# Patient Record
Sex: MALE | Race: OTHER | Employment: OTHER | URBAN - METROPOLITAN AREA
[De-identification: names, ages, dates, MRNs, and addresses within clinical notes are randomized per-mention and may not be internally consistent; named-entity substitution may affect disease eponyms.]

---

## 2017-06-29 ENCOUNTER — LAB ENCOUNTER (OUTPATIENT)
Dept: LAB | Age: 77
End: 2017-06-29
Attending: INTERNAL MEDICINE
Payer: MEDICAID

## 2017-06-29 ENCOUNTER — HOSPITAL ENCOUNTER (OUTPATIENT)
Dept: GENERAL RADIOLOGY | Age: 77
Discharge: HOME | End: 2017-06-29
Attending: INTERNAL MEDICINE
Payer: MEDICAID

## 2017-06-29 DIAGNOSIS — I10 HTN (HYPERTENSION): Primary | ICD-10-CM

## 2017-06-29 DIAGNOSIS — H11.002 PTERYGIUM OF LEFT EYE: ICD-10-CM

## 2017-06-29 DIAGNOSIS — M46.90: ICD-10-CM

## 2017-06-29 DIAGNOSIS — H91.90 HEARING LOSS: ICD-10-CM

## 2017-06-29 DIAGNOSIS — M46.90 INFLAMMATORY SPONDYLOPATHY (HCC): ICD-10-CM

## 2017-06-29 LAB
ALBUMIN SERPL BCP-MCNC: 3.7 G/DL (ref 3.5–4.8)
ALBUMIN/GLOB SERPL: 1 {RATIO} (ref 1–2)
ALP SERPL-CCNC: 83 U/L (ref 32–100)
ALT SERPL-CCNC: 16 U/L (ref 17–63)
ANION GAP SERPL CALC-SCNC: 12 MMOL/L (ref 0–18)
AST SERPL-CCNC: 22 U/L (ref 15–41)
BASOPHILS # BLD: 0.1 K/UL (ref 0–0.2)
BASOPHILS NFR BLD: 1 %
BILIRUB SERPL-MCNC: 0.7 MG/DL (ref 0.3–1.2)
BILIRUB UR QL: NEGATIVE
BUN SERPL-MCNC: 21 MG/DL (ref 8–20)
BUN/CREAT SERPL: 21 (ref 10–20)
CALCIUM SERPL-MCNC: 9.3 MG/DL (ref 8.5–10.5)
CHLORIDE SERPL-SCNC: 106 MMOL/L (ref 95–110)
CHOLEST SERPL-MCNC: 242 MG/DL (ref 110–200)
CLARITY UR: CLEAR
CO2 SERPL-SCNC: 23 MMOL/L (ref 22–32)
COLOR UR: YELLOW
CREAT SERPL-MCNC: 1 MG/DL (ref 0.5–1.5)
EOSINOPHIL # BLD: 0.2 K/UL (ref 0–0.7)
EOSINOPHIL NFR BLD: 3 %
ERYTHROCYTE [DISTWIDTH] IN BLOOD BY AUTOMATED COUNT: 14.5 % (ref 11–15)
GLOBULIN PLAS-MCNC: 3.6 G/DL (ref 2.5–3.7)
GLUCOSE SERPL-MCNC: 82 MG/DL (ref 70–99)
GLUCOSE UR-MCNC: NEGATIVE MG/DL
HCT VFR BLD AUTO: 39.8 % (ref 41–52)
HDLC SERPL-MCNC: 44 MG/DL
HGB BLD-MCNC: 13.5 G/DL (ref 13.5–17.5)
HGB UR QL STRIP.AUTO: NEGATIVE
KETONES UR-MCNC: NEGATIVE MG/DL
LDLC SERPL CALC-MCNC: 176 MG/DL (ref 0–99)
LEUKOCYTE ESTERASE UR QL STRIP.AUTO: NEGATIVE
LYMPHOCYTES # BLD: 1.9 K/UL (ref 1–4)
LYMPHOCYTES NFR BLD: 28 %
MCH RBC QN AUTO: 32.1 PG (ref 27–32)
MCHC RBC AUTO-ENTMCNC: 33.9 G/DL (ref 32–37)
MCV RBC AUTO: 94.9 FL (ref 80–100)
MONOCYTES # BLD: 0.8 K/UL (ref 0–1)
MONOCYTES NFR BLD: 12 %
NEUTROPHILS # BLD AUTO: 3.8 K/UL (ref 1.8–7.7)
NEUTROPHILS NFR BLD: 56 %
NITRITE UR QL STRIP.AUTO: NEGATIVE
NONHDLC SERPL-MCNC: 198 MG/DL
OSMOLALITY UR CALC.SUM OF ELEC: 294 MOSM/KG (ref 275–295)
PH UR: 5 [PH] (ref 5–8)
PLATELET # BLD AUTO: 276 K/UL (ref 140–400)
PMV BLD AUTO: 8.9 FL (ref 7.4–10.3)
POTASSIUM SERPL-SCNC: 4 MMOL/L (ref 3.3–5.1)
PROT SERPL-MCNC: 7.3 G/DL (ref 5.9–8.4)
PROT UR-MCNC: NEGATIVE MG/DL
RBC # BLD AUTO: 4.19 M/UL (ref 4.5–5.9)
SODIUM SERPL-SCNC: 141 MMOL/L (ref 136–144)
SP GR UR STRIP: 1.02 (ref 1–1.03)
TRIGL SERPL-MCNC: 111 MG/DL (ref 1–149)
UROBILINOGEN UR STRIP-ACNC: <2
VIT C UR-MCNC: NEGATIVE MG/DL
WBC # BLD AUTO: 6.7 K/UL (ref 4–11)

## 2017-06-29 PROCEDURE — 72050 X-RAY EXAM NECK SPINE 4/5VWS: CPT | Performed by: INTERNAL MEDICINE

## 2017-06-29 PROCEDURE — 81003 URINALYSIS AUTO W/O SCOPE: CPT

## 2017-06-29 PROCEDURE — 85025 COMPLETE CBC W/AUTO DIFF WBC: CPT

## 2017-06-29 PROCEDURE — 36415 COLL VENOUS BLD VENIPUNCTURE: CPT

## 2017-06-29 PROCEDURE — 80053 COMPREHEN METABOLIC PANEL: CPT

## 2017-06-29 PROCEDURE — 80061 LIPID PANEL: CPT

## 2017-06-30 ENCOUNTER — OFFICE VISIT (OUTPATIENT)
Dept: OTOLARYNGOLOGY | Facility: CLINIC | Age: 77
End: 2017-06-30

## 2017-06-30 ENCOUNTER — OFFICE VISIT (OUTPATIENT)
Dept: AUDIOLOGY | Facility: CLINIC | Age: 77
End: 2017-06-30

## 2017-06-30 VITALS
HEIGHT: 67 IN | HEART RATE: 73 BPM | DIASTOLIC BLOOD PRESSURE: 83 MMHG | WEIGHT: 168 LBS | TEMPERATURE: 98 F | SYSTOLIC BLOOD PRESSURE: 129 MMHG | BODY MASS INDEX: 26.37 KG/M2

## 2017-06-30 DIAGNOSIS — H90.6 MIXED HEARING LOSS, BILATERAL: Primary | ICD-10-CM

## 2017-06-30 DIAGNOSIS — H65.33 CHRONIC MUCOID OTITIS MEDIA OF BOTH EARS: ICD-10-CM

## 2017-06-30 DIAGNOSIS — H90.3 SENSORINEURAL HEARING LOSS (SNHL) OF BOTH EARS: Primary | ICD-10-CM

## 2017-06-30 PROCEDURE — 99212 OFFICE O/P EST SF 10 MIN: CPT | Performed by: OTOLARYNGOLOGY

## 2017-06-30 PROCEDURE — 92567 TYMPANOMETRY: CPT | Performed by: AUDIOLOGIST

## 2017-06-30 PROCEDURE — 92557 COMPREHENSIVE HEARING TEST: CPT | Performed by: AUDIOLOGIST

## 2017-06-30 PROCEDURE — 99203 OFFICE O/P NEW LOW 30 MIN: CPT | Performed by: OTOLARYNGOLOGY

## 2017-06-30 RX ORDER — AMOXICILLIN 500 MG/1
500 CAPSULE ORAL 3 TIMES DAILY
Qty: 30 CAPSULE | Refills: 0 | Status: SHIPPED | OUTPATIENT
Start: 2017-06-30 | End: 2017-07-07

## 2017-06-30 RX ORDER — MELOXICAM 7.5 MG/1
TABLET ORAL
Refills: 0 | Status: ON HOLD | COMMUNITY
Start: 2017-06-28 | End: 2020-10-03

## 2017-06-30 RX ORDER — METHYLPREDNISOLONE 4 MG/1
TABLET ORAL
Qty: 1 PACKAGE | Refills: 0 | Status: ON HOLD | OUTPATIENT
Start: 2017-06-30 | End: 2020-10-03

## 2017-06-30 NOTE — PROGRESS NOTES
Anderson Ward is a 65year old male. Patient presents with:  Hearing Loss: Patient is here today c/o bilateral clogged ears        HISTORY OF PRESENT ILLNESS  6/30/2017   Here for evaluation of bilateral hearing loss.  Patient feels this has worsened over Eyebrows - Normal. Skull - Normal.   Nasopharynx Normal External nose - Normal.  .   Neurological Normal Memory - Normal. Cranial nerves - Cranial nerves II through XII grossly intact.    Neck Exam Normal  normal to inspection   Psychiatric Normal   Appropr

## 2017-07-01 NOTE — PROGRESS NOTES
[address] Inderjit is a 65year old male     Referring Provider: Thang Briseno   YOB: 1940  Medical Record: [id number]      Patient Hearing History:    Patient reported recent decrease of hearing, left worse than right.

## 2017-07-08 ENCOUNTER — HOSPITAL ENCOUNTER (OUTPATIENT)
Dept: GENERAL RADIOLOGY | Age: 77
Discharge: HOME | End: 2017-07-08
Attending: INTERNAL MEDICINE
Payer: MEDICAID

## 2017-07-08 DIAGNOSIS — E78.00 HYPERCHOLESTEROLEMIA: ICD-10-CM

## 2017-07-08 PROCEDURE — 71020 XR CHEST PA + LAT CHEST (CPT=71020): CPT | Performed by: INTERNAL MEDICINE

## 2017-07-21 ENCOUNTER — OFFICE VISIT (OUTPATIENT)
Dept: OTOLARYNGOLOGY | Facility: CLINIC | Age: 77
End: 2017-07-21

## 2017-07-21 VITALS
WEIGHT: 168 LBS | BODY MASS INDEX: 26.37 KG/M2 | HEIGHT: 67 IN | TEMPERATURE: 99 F | DIASTOLIC BLOOD PRESSURE: 70 MMHG | SYSTOLIC BLOOD PRESSURE: 128 MMHG

## 2017-07-21 DIAGNOSIS — H65.32 CHRONIC MUCOID OTITIS MEDIA OF LEFT EAR: Primary | ICD-10-CM

## 2017-07-21 PROCEDURE — 99212 OFFICE O/P EST SF 10 MIN: CPT | Performed by: OTOLARYNGOLOGY

## 2017-07-21 PROCEDURE — 69420 INCISION OF EARDRUM: CPT | Performed by: OTOLARYNGOLOGY

## 2017-07-21 PROCEDURE — 99213 OFFICE O/P EST LOW 20 MIN: CPT | Performed by: OTOLARYNGOLOGY

## 2017-07-21 RX ORDER — GABAPENTIN 300 MG/1
300 CAPSULE ORAL 3 TIMES DAILY
COMMUNITY

## 2017-07-21 RX ORDER — ATORVASTATIN CALCIUM 20 MG/1
20 TABLET, FILM COATED ORAL NIGHTLY
Status: ON HOLD | COMMUNITY
End: 2020-10-03

## 2017-07-21 NOTE — PROGRESS NOTES
Niall Montiel is a 65year old male. Patient presents with: Follow - Up: patient presents for 3 wk f/u after finishing medication for mucoid otitis of both ears        HISTORY OF PRESENT ILLNESS  6/30/2017   Here for evaluation of bilateral hearing loss. No suspicious lesions bruises or masses.    Constitutional Normal Overall appearance - Normal.   Head/Face Normal Facial features - Normal. Eyebrows - Normal. Skull - Normal.   Nasopharynx Normal External nose - Normal.  .   Neurological Normal Memory - Nor

## 2017-07-22 ENCOUNTER — HOSPITAL ENCOUNTER (OUTPATIENT)
Dept: GENERAL RADIOLOGY | Age: 77
Discharge: HOME | End: 2017-07-22
Attending: INTERNAL MEDICINE
Payer: MEDICAID

## 2017-07-22 DIAGNOSIS — I62.00 NONTRAUMATIC SUBDURAL HEMORRHAGE (HCC): ICD-10-CM

## 2017-07-22 DIAGNOSIS — Z98.890 S/P CRANIOTOMY: ICD-10-CM

## 2017-07-22 PROCEDURE — 70260 X-RAY EXAM OF SKULL: CPT | Performed by: INTERNAL MEDICINE

## 2017-09-08 ENCOUNTER — HOSPITAL ENCOUNTER (OUTPATIENT)
Dept: CV DIAGNOSTICS | Facility: HOSPITAL | Age: 77
Discharge: HOME | End: 2017-09-08
Attending: INTERNAL MEDICINE
Payer: MEDICAID

## 2017-09-08 DIAGNOSIS — R00.2 PALPITATIONS: ICD-10-CM

## 2017-09-08 PROCEDURE — 93227 XTRNL ECG REC<48 HR R&I: CPT | Performed by: INTERNAL MEDICINE

## 2017-09-09 ENCOUNTER — HOSPITAL ENCOUNTER (OUTPATIENT)
Dept: CV DIAGNOSTICS | Facility: HOSPITAL | Age: 77
Discharge: HOME | End: 2017-09-09
Attending: INTERNAL MEDICINE
Payer: MEDICAID

## 2017-09-09 PROCEDURE — 93225 XTRNL ECG REC<48 HRS REC: CPT | Performed by: INTERNAL MEDICINE

## 2017-10-23 PROBLEM — H40.1492 PSEUDOEXFOLIATION (PXF) GLAUCOMA, MODERATE STAGE: Status: ACTIVE | Noted: 2017-10-23

## 2017-10-23 PROBLEM — T85.22XA DISLOCATION OF INTRAOCULAR LENS, INITIAL ENCOUNTER: Status: ACTIVE | Noted: 2017-10-23

## 2018-02-17 ENCOUNTER — HOSPITAL ENCOUNTER (OUTPATIENT)
Dept: GENERAL RADIOLOGY | Age: 78
Discharge: HOME | End: 2018-02-17
Attending: INTERNAL MEDICINE
Payer: MEDICAID

## 2018-02-17 DIAGNOSIS — R52 PAIN: ICD-10-CM

## 2018-02-17 PROCEDURE — 73030 X-RAY EXAM OF SHOULDER: CPT | Performed by: INTERNAL MEDICINE

## 2018-11-24 ENCOUNTER — HOSPITAL ENCOUNTER (OUTPATIENT)
Dept: GENERAL RADIOLOGY | Age: 78
Discharge: HOME | End: 2018-11-24
Attending: INTERNAL MEDICINE
Payer: MEDICAID

## 2018-11-24 ENCOUNTER — LAB ENCOUNTER (OUTPATIENT)
Dept: LAB | Age: 78
End: 2018-11-24
Attending: INTERNAL MEDICINE
Payer: MEDICAID

## 2018-11-24 DIAGNOSIS — H40.9 GLAUCOMA: ICD-10-CM

## 2018-11-24 DIAGNOSIS — M46.92 UNSPECIFIED INFLAMMATORY SPONDYLOPATHY, CERVICAL REGION (HCC): ICD-10-CM

## 2018-11-24 DIAGNOSIS — Z00.00 BLOOD TESTS FOR ROUTINE GENERAL PHYSICAL EXAMINATION: Primary | ICD-10-CM

## 2018-11-24 DIAGNOSIS — I10 ESSENTIAL HYPERTENSION, BENIGN: ICD-10-CM

## 2018-11-24 DIAGNOSIS — I10 ESSENTIAL (PRIMARY) HYPERTENSION: ICD-10-CM

## 2018-11-24 PROCEDURE — 36415 COLL VENOUS BLD VENIPUNCTURE: CPT

## 2018-11-24 PROCEDURE — 80061 LIPID PANEL: CPT

## 2018-11-24 PROCEDURE — 80053 COMPREHEN METABOLIC PANEL: CPT

## 2018-11-24 PROCEDURE — 85025 COMPLETE CBC W/AUTO DIFF WBC: CPT

## 2018-11-24 PROCEDURE — 72050 X-RAY EXAM NECK SPINE 4/5VWS: CPT | Performed by: INTERNAL MEDICINE

## 2018-11-24 PROCEDURE — 81003 URINALYSIS AUTO W/O SCOPE: CPT

## 2019-04-02 ENCOUNTER — LAB ENCOUNTER (OUTPATIENT)
Dept: LAB | Age: 79
End: 2019-04-02
Attending: INTERNAL MEDICINE
Payer: MEDICAID

## 2019-04-02 DIAGNOSIS — M26.601 RIGHT TEMPOROMANDIBULAR JOINT DISORDER, UNSPECIFIED: Primary | ICD-10-CM

## 2019-04-02 DIAGNOSIS — M46.92 UNSPECIFIED INFLAMMATORY SPONDYLOPATHY, CERVICAL REGION (HCC): ICD-10-CM

## 2019-04-02 DIAGNOSIS — K21.9 GASTROESOPHAGEAL REFLUX DISEASE WITHOUT ESOPHAGITIS: ICD-10-CM

## 2019-04-02 PROCEDURE — 36415 COLL VENOUS BLD VENIPUNCTURE: CPT

## 2019-04-02 PROCEDURE — 85025 COMPLETE CBC W/AUTO DIFF WBC: CPT

## 2020-10-03 ENCOUNTER — APPOINTMENT (OUTPATIENT)
Dept: CV DIAGNOSTICS | Facility: HOSPITAL | Age: 80
End: 2020-10-03
Attending: INTERNAL MEDICINE
Payer: MEDICAID

## 2020-10-03 ENCOUNTER — HOSPITAL ENCOUNTER (OUTPATIENT)
Facility: HOSPITAL | Age: 80
Setting detail: OBSERVATION
Discharge: HOME | End: 2020-10-03
Attending: EMERGENCY MEDICINE | Admitting: INTERNAL MEDICINE
Payer: MEDICAID

## 2020-10-03 ENCOUNTER — APPOINTMENT (OUTPATIENT)
Dept: NUCLEAR MEDICINE | Facility: HOSPITAL | Age: 80
End: 2020-10-03
Attending: INTERNAL MEDICINE
Payer: MEDICAID

## 2020-10-03 ENCOUNTER — APPOINTMENT (OUTPATIENT)
Dept: GENERAL RADIOLOGY | Facility: HOSPITAL | Age: 80
End: 2020-10-03
Attending: EMERGENCY MEDICINE
Payer: MEDICAID

## 2020-10-03 ENCOUNTER — APPOINTMENT (OUTPATIENT)
Dept: CT IMAGING | Facility: HOSPITAL | Age: 80
End: 2020-10-03
Attending: INTERNAL MEDICINE
Payer: MEDICAID

## 2020-10-03 ENCOUNTER — APPOINTMENT (OUTPATIENT)
Dept: CT IMAGING | Facility: HOSPITAL | Age: 80
End: 2020-10-03
Attending: EMERGENCY MEDICINE
Payer: MEDICAID

## 2020-10-03 VITALS
HEART RATE: 103 BPM | BODY MASS INDEX: 33.13 KG/M2 | DIASTOLIC BLOOD PRESSURE: 86 MMHG | RESPIRATION RATE: 18 BRPM | TEMPERATURE: 98 F | SYSTOLIC BLOOD PRESSURE: 143 MMHG | OXYGEN SATURATION: 96 % | WEIGHT: 180 LBS | HEIGHT: 62 IN

## 2020-10-03 DIAGNOSIS — R07.9 ACUTE CHEST PAIN: Primary | ICD-10-CM

## 2020-10-03 PROCEDURE — 99285 EMERGENCY DEPT VISIT HI MDM: CPT

## 2020-10-03 PROCEDURE — 71260 CT THORAX DX C+: CPT | Performed by: EMERGENCY MEDICINE

## 2020-10-03 PROCEDURE — 96374 THER/PROPH/DIAG INJ IV PUSH: CPT

## 2020-10-03 PROCEDURE — 85025 COMPLETE CBC W/AUTO DIFF WBC: CPT | Performed by: EMERGENCY MEDICINE

## 2020-10-03 PROCEDURE — 85379 FIBRIN DEGRADATION QUANT: CPT | Performed by: EMERGENCY MEDICINE

## 2020-10-03 PROCEDURE — 93018 CV STRESS TEST I&R ONLY: CPT | Performed by: INTERNAL MEDICINE

## 2020-10-03 PROCEDURE — 78452 HT MUSCLE IMAGE SPECT MULT: CPT | Performed by: INTERNAL MEDICINE

## 2020-10-03 PROCEDURE — 93016 CV STRESS TEST SUPVJ ONLY: CPT | Performed by: INTERNAL MEDICINE

## 2020-10-03 PROCEDURE — 93017 CV STRESS TEST TRACING ONLY: CPT | Performed by: INTERNAL MEDICINE

## 2020-10-03 PROCEDURE — 92610 EVALUATE SWALLOWING FUNCTION: CPT

## 2020-10-03 PROCEDURE — 93010 ELECTROCARDIOGRAM REPORT: CPT | Performed by: EMERGENCY MEDICINE

## 2020-10-03 PROCEDURE — 81003 URINALYSIS AUTO W/O SCOPE: CPT | Performed by: EMERGENCY MEDICINE

## 2020-10-03 PROCEDURE — 70450 CT HEAD/BRAIN W/O DYE: CPT | Performed by: INTERNAL MEDICINE

## 2020-10-03 PROCEDURE — 84484 ASSAY OF TROPONIN QUANT: CPT | Performed by: INTERNAL MEDICINE

## 2020-10-03 PROCEDURE — 87493 C DIFF AMPLIFIED PROBE: CPT | Performed by: INTERNAL MEDICINE

## 2020-10-03 PROCEDURE — C9113 INJ PANTOPRAZOLE SODIUM, VIA: HCPCS | Performed by: EMERGENCY MEDICINE

## 2020-10-03 PROCEDURE — 83880 ASSAY OF NATRIURETIC PEPTIDE: CPT | Performed by: EMERGENCY MEDICINE

## 2020-10-03 PROCEDURE — 93005 ELECTROCARDIOGRAM TRACING: CPT

## 2020-10-03 PROCEDURE — 84484 ASSAY OF TROPONIN QUANT: CPT | Performed by: EMERGENCY MEDICINE

## 2020-10-03 PROCEDURE — 80053 COMPREHEN METABOLIC PANEL: CPT | Performed by: EMERGENCY MEDICINE

## 2020-10-03 PROCEDURE — 71045 X-RAY EXAM CHEST 1 VIEW: CPT | Performed by: EMERGENCY MEDICINE

## 2020-10-03 RX ORDER — ASPIRIN 81 MG/1
81 TABLET ORAL DAILY
COMMUNITY

## 2020-10-03 RX ORDER — SIMVASTATIN 10 MG
10 TABLET ORAL NIGHTLY
COMMUNITY

## 2020-10-03 RX ORDER — HYDROXYZINE HYDROCHLORIDE 10 MG/1
10 TABLET, FILM COATED ORAL EVERY 4 HOURS PRN
COMMUNITY

## 2020-10-03 RX ORDER — PANTOPRAZOLE SODIUM 40 MG/1
40 TABLET, DELAYED RELEASE ORAL
Status: DISCONTINUED | OUTPATIENT
Start: 2020-10-03 | End: 2020-10-03

## 2020-10-03 RX ORDER — ACETAMINOPHEN 500 MG
500 TABLET ORAL EVERY 6 HOURS PRN
Status: DISCONTINUED | OUTPATIENT
Start: 2020-10-03 | End: 2020-10-03

## 2020-10-03 RX ORDER — SERTRALINE HYDROCHLORIDE 25 MG/1
25 TABLET, FILM COATED ORAL DAILY
COMMUNITY

## 2020-10-03 RX ORDER — AMLODIPINE BESYLATE 5 MG/1
5 TABLET ORAL DAILY
COMMUNITY
Start: 2020-09-26

## 2020-10-03 RX ORDER — ASPIRIN 81 MG/1
81 TABLET ORAL DAILY
Status: DISCONTINUED | OUTPATIENT
Start: 2020-10-03 | End: 2020-10-03

## 2020-10-03 RX ORDER — HYDROXYZINE HYDROCHLORIDE 10 MG/1
10 TABLET, FILM COATED ORAL EVERY 4 HOURS PRN
Status: DISCONTINUED | OUTPATIENT
Start: 2020-10-03 | End: 2020-10-03

## 2020-10-03 RX ORDER — METOPROLOL SUCCINATE 25 MG/1
25 TABLET, EXTENDED RELEASE ORAL
Status: DISCONTINUED | OUTPATIENT
Start: 2020-10-03 | End: 2020-10-03

## 2020-10-03 RX ORDER — ACETAMINOPHEN 500 MG
500 TABLET ORAL EVERY 6 HOURS PRN
COMMUNITY

## 2020-10-03 RX ORDER — HEPARIN SODIUM 5000 [USP'U]/ML
5000 INJECTION, SOLUTION INTRAVENOUS; SUBCUTANEOUS EVERY 8 HOURS SCHEDULED
Status: DISCONTINUED | OUTPATIENT
Start: 2020-10-03 | End: 2020-10-03

## 2020-10-03 RX ORDER — SERTRALINE HYDROCHLORIDE 25 MG/1
25 TABLET, FILM COATED ORAL DAILY
Status: DISCONTINUED | OUTPATIENT
Start: 2020-10-03 | End: 2020-10-03

## 2020-10-03 RX ORDER — OMEGA-3-ACID ETHYL ESTERS 1 G/1
1 CAPSULE, LIQUID FILLED ORAL 2 TIMES DAILY
Status: DISCONTINUED | OUTPATIENT
Start: 2020-10-03 | End: 2020-10-03

## 2020-10-03 RX ORDER — OMEGA-3-ACID ETHYL ESTERS 1 G/1
1 CAPSULE, LIQUID FILLED ORAL 2 TIMES DAILY
COMMUNITY

## 2020-10-03 RX ORDER — PRAVASTATIN SODIUM 10 MG
10 TABLET ORAL NIGHTLY
Status: DISCONTINUED | OUTPATIENT
Start: 2020-10-03 | End: 2020-10-03

## 2020-10-03 RX ORDER — LATANOPROST 50 UG/ML
1 SOLUTION/ DROPS OPHTHALMIC NIGHTLY
Status: DISCONTINUED | OUTPATIENT
Start: 2020-10-03 | End: 2020-10-03

## 2020-10-03 RX ORDER — AMLODIPINE BESYLATE 5 MG/1
5 TABLET ORAL DAILY
Status: DISCONTINUED | OUTPATIENT
Start: 2020-10-03 | End: 2020-10-03

## 2020-10-03 NOTE — CONSULTS
[facility]  Report of Consultation    Belgica Becker Patient Status:  Observation    1940 MRN [id number]   Location [facility] 3W/SW Attending Jessie Dorado MD   Hosp Day # 0 PCP Anderson Jones MD, MD     Reason for Consultation: mg, Oral, Daily  •  Pravastatin Sodium (PRAVACHOL) tab 10 mg, 10 mg, Oral, Nightly  •  potassium chloride 40 mEq in sodium chloride 0.9% 250 mL IVPB, 40 mEq, Intravenous, Once  •  Pantoprazole Sodium (PROTONIX) EC tab 40 mg, 40 mg, Oral, QAM AC  •  Heparin related to exertion  - normal lexiscan 10/3/20  - on aspirin, pravastatin    2. HTN  - elevated  - on amlodipine 5 mg daily    3. Anxiety    Plan: Patient can discharge home had normal lexiscan. Will restart metoprolol and continue amlodipine.   Plan for f

## 2020-10-03 NOTE — CONSULTS
[facility] - [facility]    Report of Consultation    Ale Patient Status:  Observation    1940 MRN [id number]   Location [facility] 3W/SW Attending Magaly Hawkins MD   Hosp Day # 0 PCP Alison Stapleton MD, MD     Date of Admiss g by mouth 2 (two) times daily. •  Sertraline HCl (ZOLOFT) 25 MG Oral Tab, Take 25 mg by mouth daily. •  simvastatin 10 MG Oral Tab, Take 10 mg by mouth nightly.     •  LATANOPROST 0.005 % Ophthalmic Solution, INSTILL 1 DROP INTO BOTH EYES AT BEDTIME cooperative    Results:     Laboratory Data:  Lab Results   Component Value Date    WBC 8.7 10/03/2020    HGB 14.4 10/03/2020    HCT 41.2 10/03/2020    .0 10/03/2020    CREATSERUM 1.31 (H) 10/03/2020    BUN 16 10/03/2020     10/03/2020    K 3. without further acute intrathoracic process. 3. Borderline ascending thoracic aortic ectasia. 4. Lesser incidental findings as above. A preliminary report was issued by the [address] [place] [address] Radiology teleradiology service. There are no major discrepancies.    Elmer Asif

## 2020-10-03 NOTE — H&P
[facility]    PATIENT'S NAME: Tish Farooq   ATTENDING PHYSICIAN: Abhishek Solano MD   PATIENT ACCOUNT#:   [id number]    LOCATION:  [address] [place] [address] RECORD #:   [id number]       YOB: 1940  ADMISSION DATE:       10/03/20 The patient has no history of smoking cigarettes, alcohol use, or substance abuse.   Otherwise, he is fairly active and daughter stated that the patient had a anxiety problems in [facility].    REVIEW OF SYSTEMS:  Could not be obtained completely from the patie

## 2020-10-03 NOTE — PROGRESS NOTES
Cdiff came back negative. Plan is to follow up with GI in two weeks and take OTC imodium as needed at home. IV removed and discharge education and instructions given including chest pain handout.

## 2020-10-03 NOTE — ED INITIAL ASSESSMENT (HPI)
Family states that the Pt started to have chest pain  Since earlier this am.  No C/O shortness of breath.  No N/V

## 2020-10-03 NOTE — SLP NOTE
ADULT SWALLOWING EVALUATION    ASSESSMENT    ASSESSMENT/OVERALL IMPRESSION:  PPE REQUIRED. THIS SLP WORE GLOVES AND DROPLET MASK. HANDS SANITIZED/WASHED UPON ENTRANCE/EXIT. SLP BSSE orders received and acknowledged.  A swallow evaluation warranted as pt ESOPHAGEAL RELATED CONCERNS.      RECOMMENDATIONS   Diet Recommendations - Solids: Mechanical soft chopped(extra sauces/gravies )  Diet Recommendations - Liquid: Thin(small sips )  1:1 FEED SUPERVISION     Compensatory Strategies Recommended: Slow rate;Smal assistance;Spoon;Cup;Single sips; Consecutive swallows  Patient Positioning: Upright;Midline    Oral Phase of Swallow: Within Functional Limits    Pharyngeal Phase of Swallow:  Within Functional Limits  (Please note: Silent aspiration cannot be evaluated cli

## 2020-10-03 NOTE — ED PROVIDER NOTES
Patient Seen in: [facility] AND [facility] Emergency Department    History   Patient presents with:  Chest Pain Angina    Stated Complaint:  Chest pain    HPI    78 yo M with PMH HTN, HL presenting for evaluation of complaints of anterior chest discomfort describe All other systems reviewed and negative except as noted above. PSFH elements reviewed from today and agreed except as otherwise stated in HPI.     Physical Exam     ED Triage Vitals   BP 10/03/20 0437 (!) 162/94   Pulse 10/03/20 0437 102   Resp 10/0 CBC W/ DIFFERENTIAL[[id number]]          Abnormal            Final result                 Please view results for these tests on the individual orders.    TROPONIN I   TROPONIN I   RAINBOW DRAW BLUE   RAINBOW DRAW LAVENDER   RAINBOW DRAW LIGHT GREEN   NOONAN addressed. If you are not the intended recipient of this report, you are hereby notified that any copying, distribution, dissemination or action taken in relation to the contents of this report is strictly prohibited and may be unlawful.  If you have receiv calcifications are observed. Atherosclerotic vascular calcifications are present in the coronary vessels. THORACIC AORTA: Unremarkable configuration without evidence of dissection.  Scattered atherosclerotic vascular calcifications are seen, and peripheral discrepancies.    Dictated by (CST): Milvia Yepez MD on 10/03/2020 at 6:14 AM     Finalized by (CST): Milvia Yepez MD on 10/03/2020 at 6:20 AM               MDM     DIFFERENTIAL DIAGNOSIS: After history and physical exam differential diagnosis include Discharge Medication List

## 2020-10-03 NOTE — PLAN OF CARE
Pt alert and oriented x 2. Pt came in to ED alert and oriented but arrived to the unit confused and and forgetful. Translation line was used. Pt forgot he was here for CP and thought he was here for head surgery that he had thirteen years ago.  MD was notif ordered  - Implement neutropenic guidelines  Outcome: Adequate for Discharge     Problem: SAFETY ADULT - FALL  Goal: Free from fall injury  Description: INTERVENTIONS:  - Assess pt frequently for physical needs  - Identify cognitive and physical deficits a

## 2020-10-03 NOTE — PROGRESS NOTES
[facility]    Progress Note    Steve Morejon Patient Status:  Observation    1940 MRN [id number]   Location [facility] 3W/SW Attending Jorge Bourgeois MD   Hosp Day # 0 PCP Wagner Newell MD, MD     Subjective:     Constit acute intracranial abnormality. Generalized atrophy with mild chronic microvascular white matter ischemia.     Dictated by (CST): Dara Velazquez MD on 10/03/2020 at 10:10 AM     Finalized by (IVAN): Dara Velazquez MD on 10/03/2020 at 10:20 AM

## 2020-10-03 NOTE — ED NOTES
Orders for admission, patient is aware of plan and ready to go upstairs. Any questions, please call ED RN reynaldo  at extension [phone].    Type of COVID test sent: negative rapid  COVID Suspicion level: Low    Titratable drug(s) infusing:  Rate:    LOC at time

## 2020-10-30 NOTE — DISCHARGE SUMMARY
[facility]    PATIENT'S NAME: Erika Nascimento   ATTENDING PHYSICIAN: Abhishek Wong MD   PATIENT ACCOUNT#:   [id number]    LOCATION:  [address] [place] [address] RECORD #:   [id number]       YOB: 1940  ADMISSION DATE:       10/03/20

## 2021-03-08 DIAGNOSIS — Z23 NEED FOR VACCINATION: ICD-10-CM

## 2021-10-02 ENCOUNTER — HOSPITAL ENCOUNTER (OUTPATIENT)
Dept: GENERAL RADIOLOGY | Age: 81
Discharge: HOME | End: 2021-10-02
Attending: INTERNAL MEDICINE
Payer: MEDICAID

## 2021-10-02 DIAGNOSIS — M13.862 OTHER SPECIFIED ARTHRITIS, LEFT KNEE: ICD-10-CM

## 2021-10-02 DIAGNOSIS — M25.551 RIGHT HIP PAIN: ICD-10-CM

## 2021-10-02 PROCEDURE — 73560 X-RAY EXAM OF KNEE 1 OR 2: CPT | Performed by: INTERNAL MEDICINE

## 2021-10-02 PROCEDURE — 73502 X-RAY EXAM HIP UNI 2-3 VIEWS: CPT | Performed by: INTERNAL MEDICINE

## 2022-07-25 ENCOUNTER — LAB ENCOUNTER (OUTPATIENT)
Dept: LAB | Age: 82
End: 2022-07-25
Attending: INTERNAL MEDICINE
Payer: MEDICAID

## 2022-07-25 ENCOUNTER — EKG ENCOUNTER (OUTPATIENT)
Dept: LAB | Age: 82
End: 2022-07-25
Attending: INTERNAL MEDICINE
Payer: MEDICAID

## 2022-07-25 ENCOUNTER — HOSPITAL ENCOUNTER (OUTPATIENT)
Dept: GENERAL RADIOLOGY | Age: 82
Discharge: HOME | End: 2022-07-25
Attending: INTERNAL MEDICINE
Payer: MEDICAID

## 2022-07-25 DIAGNOSIS — M54.50 LOW BACK PAIN, UNSPECIFIED: ICD-10-CM

## 2022-07-25 DIAGNOSIS — R32 UNSPECIFIED URINARY INCONTINENCE: ICD-10-CM

## 2022-07-25 DIAGNOSIS — Z00.00 ROUTINE GENERAL MEDICAL EXAMINATION AT A HEALTH CARE FACILITY: Primary | ICD-10-CM

## 2022-07-25 LAB
ALBUMIN SERPL-MCNC: 3.5 G/DL (ref 3.4–5)
ALBUMIN/GLOB SERPL: 0.8 {RATIO} (ref 1–2)
ALP LIVER SERPL-CCNC: 101 U/L
ALT SERPL-CCNC: 27 U/L
ANION GAP SERPL CALC-SCNC: 5 MMOL/L (ref 0–18)
AST SERPL-CCNC: 25 U/L (ref 15–37)
BASOPHILS # BLD AUTO: 0.05 X10(3) UL (ref 0–0.2)
BASOPHILS NFR BLD AUTO: 0.7 %
BILIRUB SERPL-MCNC: 0.6 MG/DL (ref 0.1–2)
BILIRUB UR QL: NEGATIVE
BUN BLD-MCNC: 16 MG/DL (ref 7–18)
BUN/CREAT SERPL: 9.8 (ref 10–20)
CALCIUM BLD-MCNC: 9.7 MG/DL (ref 8.5–10.1)
CHLORIDE SERPL-SCNC: 107 MMOL/L (ref 98–112)
CHOLEST SERPL-MCNC: 185 MG/DL (ref ?–200)
CLARITY UR: CLEAR
CO2 SERPL-SCNC: 27 MMOL/L (ref 21–32)
COLOR UR: YELLOW
CREAT BLD-MCNC: 1.64 MG/DL
DEPRECATED RDW RBC AUTO: 57 FL (ref 35.1–46.3)
EOSINOPHIL # BLD AUTO: 0.21 X10(3) UL (ref 0–0.7)
EOSINOPHIL NFR BLD AUTO: 3 %
ERYTHROCYTE [DISTWIDTH] IN BLOOD BY AUTOMATED COUNT: 16.9 % (ref 11–15)
FASTING PATIENT LIPID ANSWER: YES
FASTING STATUS PATIENT QL REPORTED: YES
GLOBULIN PLAS-MCNC: 4.2 G/DL (ref 2.8–4.4)
GLUCOSE BLD-MCNC: 86 MG/DL (ref 70–99)
GLUCOSE UR-MCNC: NEGATIVE MG/DL
HCT VFR BLD AUTO: 42.1 %
HDLC SERPL-MCNC: 47 MG/DL (ref 40–59)
HGB BLD-MCNC: 14.2 G/DL
HGB UR QL STRIP.AUTO: NEGATIVE
IMM GRANULOCYTES # BLD AUTO: 0.01 X10(3) UL (ref 0–1)
IMM GRANULOCYTES NFR BLD: 0.1 %
KETONES UR-MCNC: NEGATIVE MG/DL
LDLC SERPL CALC-MCNC: 121 MG/DL (ref ?–100)
LEUKOCYTE ESTERASE UR QL STRIP.AUTO: NEGATIVE
LYMPHOCYTES # BLD AUTO: 1.52 X10(3) UL (ref 1–4)
LYMPHOCYTES NFR BLD AUTO: 21.7 %
MCH RBC QN AUTO: 30.9 PG (ref 26–34)
MCHC RBC AUTO-ENTMCNC: 33.7 G/DL (ref 31–37)
MCV RBC AUTO: 91.7 FL
MONOCYTES # BLD AUTO: 0.72 X10(3) UL (ref 0.1–1)
MONOCYTES NFR BLD AUTO: 10.3 %
NEUTROPHILS # BLD AUTO: 4.49 X10 (3) UL (ref 1.5–7.7)
NEUTROPHILS # BLD AUTO: 4.49 X10(3) UL (ref 1.5–7.7)
NEUTROPHILS NFR BLD AUTO: 64.2 %
NITRITE UR QL STRIP.AUTO: NEGATIVE
NONHDLC SERPL-MCNC: 138 MG/DL (ref ?–130)
OSMOLALITY SERPL CALC.SUM OF ELEC: 288 MOSM/KG (ref 275–295)
PH UR: 6.5 [PH] (ref 5–8)
PLATELET # BLD AUTO: 356 10(3)UL (ref 150–450)
POTASSIUM SERPL-SCNC: 4.5 MMOL/L (ref 3.5–5.1)
PROT SERPL-MCNC: 7.7 G/DL (ref 6.4–8.2)
PROT UR-MCNC: NEGATIVE MG/DL
PSA FREE MFR SERPL: 36 %
PSA FREE SERPL-MCNC: 0.39 NG/ML
PSA SERPL-MCNC: 1.07 NG/ML (ref ?–4)
RBC # BLD AUTO: 4.59 X10(6)UL
SODIUM SERPL-SCNC: 139 MMOL/L (ref 136–145)
SP GR UR STRIP: 1.01 (ref 1–1.03)
TRIGL SERPL-MCNC: 94 MG/DL (ref 30–149)
UROBILINOGEN UR STRIP-ACNC: 0.2
VLDLC SERPL CALC-MCNC: 16 MG/DL (ref 0–30)
WBC # BLD AUTO: 7 X10(3) UL (ref 4–11)

## 2022-07-25 PROCEDURE — 85025 COMPLETE CBC W/AUTO DIFF WBC: CPT

## 2022-07-25 PROCEDURE — 81003 URINALYSIS AUTO W/O SCOPE: CPT

## 2022-07-25 PROCEDURE — 80061 LIPID PANEL: CPT

## 2022-07-25 PROCEDURE — 36415 COLL VENOUS BLD VENIPUNCTURE: CPT

## 2022-07-25 PROCEDURE — 84154 ASSAY OF PSA FREE: CPT

## 2022-07-25 PROCEDURE — 93005 ELECTROCARDIOGRAM TRACING: CPT

## 2022-07-25 PROCEDURE — 72110 X-RAY EXAM L-2 SPINE 4/>VWS: CPT | Performed by: INTERNAL MEDICINE

## 2022-07-25 PROCEDURE — 84153 ASSAY OF PSA TOTAL: CPT

## 2022-07-25 PROCEDURE — 80053 COMPREHEN METABOLIC PANEL: CPT

## 2022-07-25 PROCEDURE — 93010 ELECTROCARDIOGRAM REPORT: CPT | Performed by: INTERNAL MEDICINE

## 2022-07-26 ENCOUNTER — ORDER TRANSCRIPTION (OUTPATIENT)
Dept: PHYSICAL THERAPY | Facility: HOSPITAL | Age: 82
End: 2022-07-26

## 2022-07-26 DIAGNOSIS — M47.816 ARTHRITIS OF LUMBAR SPINE: Primary | ICD-10-CM

## 2022-07-28 ENCOUNTER — HOSPITAL ENCOUNTER (OUTPATIENT)
Age: 82
Discharge: HOME | End: 2022-07-28
Payer: MEDICAID

## 2022-07-28 ENCOUNTER — HOSPITAL ENCOUNTER (OUTPATIENT)
Dept: CT IMAGING | Age: 82
Discharge: HOME | End: 2022-07-28
Attending: INTERNAL MEDICINE
Payer: MEDICAID

## 2022-07-28 VITALS
TEMPERATURE: 98 F | OXYGEN SATURATION: 99 % | RESPIRATION RATE: 20 BRPM | SYSTOLIC BLOOD PRESSURE: 189 MMHG | DIASTOLIC BLOOD PRESSURE: 96 MMHG | HEART RATE: 66 BPM

## 2022-07-28 DIAGNOSIS — R10.9 CHRONIC ABDOMINAL PAIN: Primary | ICD-10-CM

## 2022-07-28 DIAGNOSIS — R10.9 UNSPECIFIED ABDOMINAL PAIN: ICD-10-CM

## 2022-07-28 DIAGNOSIS — G89.29 CHRONIC ABDOMINAL PAIN: Primary | ICD-10-CM

## 2022-07-28 PROCEDURE — 99212 OFFICE O/P EST SF 10 MIN: CPT

## 2022-07-28 PROCEDURE — 74176 CT ABD & PELVIS W/O CONTRAST: CPT | Performed by: INTERNAL MEDICINE

## 2022-07-28 NOTE — ED INITIAL ASSESSMENT (HPI)
Presents with daughter after having outpatient CT of abdomen/pelvis. States generalized abdominal pain for a year with nausea. No vomiting. Intermittent headaches and dizziness since 2006 \"after blood clot in the brain\" per daughter, now worse. Daughter states he has not been eating.

## 2022-08-01 ENCOUNTER — APPOINTMENT (OUTPATIENT)
Dept: CT IMAGING | Facility: HOSPITAL | Age: 82
End: 2022-08-01
Attending: EMERGENCY MEDICINE
Payer: MEDICAID

## 2022-08-01 ENCOUNTER — HOSPITAL ENCOUNTER (OUTPATIENT)
Facility: HOSPITAL | Age: 82
Setting detail: OBSERVATION
Discharge: HOME | End: 2022-08-02
Attending: EMERGENCY MEDICINE | Admitting: INTERNAL MEDICINE
Payer: MEDICAID

## 2022-08-01 DIAGNOSIS — E87.1 HYPONATREMIA: Primary | ICD-10-CM

## 2022-08-01 LAB
ANION GAP SERPL CALC-SCNC: 8 MMOL/L (ref 0–18)
ANION GAP SERPL CALC-SCNC: 8 MMOL/L (ref 0–18)
BASOPHILS # BLD AUTO: 0.02 X10(3) UL (ref 0–0.2)
BASOPHILS NFR BLD AUTO: 0.2 %
BUN BLD-MCNC: 10 MG/DL (ref 7–18)
BUN BLD-MCNC: 11 MG/DL (ref 7–18)
BUN/CREAT SERPL: 7.2 (ref 10–20)
BUN/CREAT SERPL: 7.7 (ref 10–20)
CALCIUM BLD-MCNC: 8.7 MG/DL (ref 8.5–10.1)
CALCIUM BLD-MCNC: 8.9 MG/DL (ref 8.5–10.1)
CHLORIDE SERPL-SCNC: 87 MMOL/L (ref 98–112)
CHLORIDE SERPL-SCNC: 92 MMOL/L (ref 98–112)
CO2 SERPL-SCNC: 25 MMOL/L (ref 21–32)
CO2 SERPL-SCNC: 26 MMOL/L (ref 21–32)
CREAT BLD-MCNC: 1.38 MG/DL
CREAT BLD-MCNC: 1.42 MG/DL
DEPRECATED RDW RBC AUTO: 49.8 FL (ref 35.1–46.3)
EOSINOPHIL # BLD AUTO: 0.03 X10(3) UL (ref 0–0.7)
EOSINOPHIL NFR BLD AUTO: 0.3 %
ERYTHROCYTE [DISTWIDTH] IN BLOOD BY AUTOMATED COUNT: 15.1 % (ref 11–15)
GLUCOSE BLD-MCNC: 116 MG/DL (ref 70–99)
GLUCOSE BLD-MCNC: 93 MG/DL (ref 70–99)
HCT VFR BLD AUTO: 38.7 %
HGB BLD-MCNC: 13.5 G/DL
IMM GRANULOCYTES # BLD AUTO: 0.03 X10(3) UL (ref 0–1)
IMM GRANULOCYTES NFR BLD: 0.3 %
LYMPHOCYTES # BLD AUTO: 0.69 X10(3) UL (ref 1–4)
LYMPHOCYTES NFR BLD AUTO: 7.1 %
MAGNESIUM SERPL-MCNC: 2.2 MG/DL (ref 1.6–2.6)
MCH RBC QN AUTO: 30.9 PG (ref 26–34)
MCHC RBC AUTO-ENTMCNC: 34.9 G/DL (ref 31–37)
MCV RBC AUTO: 88.6 FL
MONOCYTES # BLD AUTO: 0.9 X10(3) UL (ref 0.1–1)
MONOCYTES NFR BLD AUTO: 9.2 %
NEUTROPHILS # BLD AUTO: 8.07 X10 (3) UL (ref 1.5–7.7)
NEUTROPHILS # BLD AUTO: 8.07 X10(3) UL (ref 1.5–7.7)
NEUTROPHILS NFR BLD AUTO: 82.9 %
OSMOLALITY SERPL CALC.SUM OF ELEC: 252 MOSM/KG (ref 275–295)
OSMOLALITY SERPL CALC.SUM OF ELEC: 259 MOSM/KG (ref 275–295)
OSMOLALITY UR: 159 MOSM/KG (ref 300–1100)
PLATELET # BLD AUTO: 303 10(3)UL (ref 150–450)
POTASSIUM SERPL-SCNC: 4.3 MMOL/L (ref 3.5–5.1)
POTASSIUM SERPL-SCNC: 4.3 MMOL/L (ref 3.5–5.1)
RBC # BLD AUTO: 4.37 X10(6)UL
SARS-COV-2 RNA RESP QL NAA+PROBE: NOT DETECTED
SODIUM SERPL-SCNC: 121 MMOL/L (ref 136–145)
SODIUM SERPL-SCNC: 125 MMOL/L (ref 136–145)
SODIUM SERPL-SCNC: 129 MMOL/L (ref 136–145)
SODIUM SERPL-SCNC: 33 MMOL/L
WBC # BLD AUTO: 9.7 X10(3) UL (ref 4–11)

## 2022-08-01 PROCEDURE — 74177 CT ABD & PELVIS W/CONTRAST: CPT | Performed by: EMERGENCY MEDICINE

## 2022-08-01 RX ORDER — MAGNESIUM HYDROXIDE/ALUMINUM HYDROXICE/SIMETHICONE 120; 1200; 1200 MG/30ML; MG/30ML; MG/30ML
SUSPENSION ORAL 4 TIMES DAILY PRN
COMMUNITY

## 2022-08-01 RX ORDER — ACETAMINOPHEN 500 MG
500 TABLET ORAL EVERY 6 HOURS PRN
Status: DISCONTINUED | OUTPATIENT
Start: 2022-08-01 | End: 2022-08-02

## 2022-08-01 RX ORDER — SODIUM PHOSPHATE, DIBASIC AND SODIUM PHOSPHATE, MONOBASIC 7; 19 G/133ML; G/133ML
1 ENEMA RECTAL ONCE AS NEEDED
Status: DISCONTINUED | OUTPATIENT
Start: 2022-08-01 | End: 2022-08-02

## 2022-08-01 RX ORDER — BISACODYL 10 MG
10 SUPPOSITORY, RECTAL RECTAL
Status: DISCONTINUED | OUTPATIENT
Start: 2022-08-01 | End: 2022-08-02

## 2022-08-01 RX ORDER — METOPROLOL SUCCINATE 25 MG/1
25 TABLET, EXTENDED RELEASE ORAL DAILY
Status: DISCONTINUED | OUTPATIENT
Start: 2022-08-02 | End: 2022-08-02

## 2022-08-01 RX ORDER — ONDANSETRON 2 MG/ML
4 INJECTION INTRAMUSCULAR; INTRAVENOUS EVERY 6 HOURS PRN
Status: DISCONTINUED | OUTPATIENT
Start: 2022-08-01 | End: 2022-08-02

## 2022-08-01 RX ORDER — METOCLOPRAMIDE HYDROCHLORIDE 5 MG/ML
10 INJECTION INTRAMUSCULAR; INTRAVENOUS EVERY 8 HOURS PRN
Status: DISCONTINUED | OUTPATIENT
Start: 2022-08-01 | End: 2022-08-02

## 2022-08-01 RX ORDER — KETOROLAC TROMETHAMINE 15 MG/ML
15 INJECTION, SOLUTION INTRAMUSCULAR; INTRAVENOUS ONCE
Status: COMPLETED | OUTPATIENT
Start: 2022-08-01 | End: 2022-08-01

## 2022-08-01 RX ORDER — LATANOPROST 50 UG/ML
1 SOLUTION/ DROPS OPHTHALMIC NIGHTLY
Status: DISCONTINUED | OUTPATIENT
Start: 2022-08-01 | End: 2022-08-02

## 2022-08-01 RX ORDER — POLYETHYLENE GLYCOL 3350 17 G/17G
17 POWDER, FOR SOLUTION ORAL DAILY PRN
Status: DISCONTINUED | OUTPATIENT
Start: 2022-08-01 | End: 2022-08-02

## 2022-08-01 RX ORDER — AMLODIPINE BESYLATE 5 MG/1
5 TABLET ORAL DAILY
Status: DISCONTINUED | OUTPATIENT
Start: 2022-08-02 | End: 2022-08-02

## 2022-08-01 RX ORDER — SENNOSIDES 8.6 MG
17.2 TABLET ORAL NIGHTLY PRN
Status: DISCONTINUED | OUTPATIENT
Start: 2022-08-01 | End: 2022-08-02

## 2022-08-01 RX ORDER — SODIUM CHLORIDE 9 MG/ML
INJECTION, SOLUTION INTRAVENOUS CONTINUOUS
Status: DISCONTINUED | OUTPATIENT
Start: 2022-08-01 | End: 2022-08-01

## 2022-08-01 RX ORDER — HEPARIN SODIUM 5000 [USP'U]/ML
5000 INJECTION, SOLUTION INTRAVENOUS; SUBCUTANEOUS EVERY 8 HOURS SCHEDULED
Status: DISCONTINUED | OUTPATIENT
Start: 2022-08-01 | End: 2022-08-02

## 2022-08-01 RX ORDER — ASPIRIN 81 MG/1
81 TABLET ORAL DAILY
Status: DISCONTINUED | OUTPATIENT
Start: 2022-08-02 | End: 2022-08-02

## 2022-08-01 RX ORDER — ALPRAZOLAM 0.25 MG/1
0.25 TABLET ORAL AS NEEDED
Status: DISCONTINUED | OUTPATIENT
Start: 2022-08-01 | End: 2022-08-02

## 2022-08-01 RX ORDER — ROSUVASTATIN CALCIUM 20 MG/1
20 TABLET, COATED ORAL DAILY
Status: DISCONTINUED | OUTPATIENT
Start: 2022-08-02 | End: 2022-08-02

## 2022-08-01 RX ORDER — PANTOPRAZOLE SODIUM 20 MG/1
20 TABLET, DELAYED RELEASE ORAL
Status: DISCONTINUED | OUTPATIENT
Start: 2022-08-02 | End: 2022-08-02

## 2022-08-01 NOTE — PLAN OF CARE
Patient denies pain and nausea at this time. Patient given menu to order. PT/OT ordered.  did not work with patient as he is hard of hearing, family helped with admission. Patient on started on IV fluids. MD notified for admission orders. Problem: Patient Centered Care  Goal: Patient preferences are identified and integrated in the patient's plan of care  Description: Interventions:  - What would you like us to know as we care for you?  \"I speak Macedonian\"  - Provide timely, complete, and accurate information to patient/family  - Incorporate patient and family knowledge, values, beliefs, and cultural backgrounds into the planning and delivery of care  - Encourage patient/family to participate in care and decision-making at the level they choose  - Honor patient and family perspectives and choices  Outcome: Progressing     Problem: Patient/Family Goals  Goal: Patient/Family Long Term Goal  Description: Patient's Long Term Goal: \"I want to go home with my family\"    Interventions:  - IV fluids  -Antiemetics  - See additional Care Plan goals for specific interventions  Outcome: Progressing  Goal: Patient/Family Short Term Goal  Description: Patient's Short Term Goal: \"I am hungry\"    Interventions:   - Czech menu  -[place] to environment  - See additional Care Plan goals for specific interventions  Outcome: Progressing     Problem: PAIN - ADULT  Goal: Verbalizes/displays adequate comfort level or patient's stated pain goal  Description: INTERVENTIONS:  - Encourage pt to monitor pain and request assistance  - Assess pain using appropriate pain scale  - Administer analgesics based on type and severity of pain and evaluate response  - Implement non-pharmacological measures as appropriate and evaluate response  - Consider cultural and social influences on pain and pain management  - Manage/alleviate anxiety  - Utilize distraction and/or relaxation techniques  - Monitor for opioid side effects  - Notify MD/LIP if interventions unsuccessful or patient reports new pain  - Anticipate increased pain with activity and pre-medicate as appropriate  Outcome: Progressing     Problem: RISK FOR INFECTION - ADULT  Goal: Absence of fever/infection during anticipated neutropenic period  Description: INTERVENTIONS  - Monitor WBC  - Administer growth factors as ordered  - Implement neutropenic guidelines  Outcome: Progressing     Problem: SAFETY ADULT - FALL  Goal: Free from fall injury  Description: INTERVENTIONS:  - Assess pt frequently for physical needs  - Identify cognitive and physical deficits and behaviors that affect risk of falls.   - [place] fall precautions as indicated by assessment.  - Educate pt/family on patient safety including physical limitations  - Instruct pt to call for assistance with activity based on assessment  - Modify environment to reduce risk of injury  - Provide assistive devices as appropriate  - Consider OT/PT consult to assist with strengthening/mobility  - Encourage toileting schedule  Outcome: Progressing     Problem: DISCHARGE PLANNING  Goal: Discharge to home or other facility with appropriate resources  Description: INTERVENTIONS:  - Identify barriers to discharge w/pt and caregiver  - Include patient/family/discharge partner in discharge planning  - Arrange for needed discharge resources and transportation as appropriate  - Identify discharge learning needs (meds, wound care, etc)  - Arrange for interpreters to assist at discharge as needed  - Consider post-discharge preferences of patient/family/discharge partner  - Complete POLST form as appropriate  - Assess patient's ability to be responsible for managing their own health  - Refer to Case Management Department for coordinating discharge planning if the patient needs post-hospital services based on physician/LIP order or complex needs related to functional status, cognitive ability or social support system  Outcome: Progressing     Problem: Altered Communication/Language Barrier  Goal: Patient/Family is able to understand and participate in their care  Description: Interventions:  - Assess communication ability and preferred communication style  - Implement communication aides and strategies  - Use visual cues when possible  - Listen attentively, be patient, do not interrupt  - Minimize distractions  - Allow time for understanding and response  - Establish method for patient to ask for assistance (call light)  - Provide an  as needed  - Communicate barriers and strategies to overcome with those who interact with patient  Outcome: Progressing     Problem: GASTROINTESTINAL - ADULT  Goal: Minimal or absence of nausea and vomiting  Description: INTERVENTIONS:  - Maintain adequate hydration with IV or PO as ordered and tolerated  - Nasogastric tube to low intermittent suction as ordered  - Evaluate effectiveness of ordered antiemetic medications  - Provide nonpharmacologic comfort measures as appropriate  - Advance diet as tolerated, if ordered  - Obtain nutritional consult as needed  - Evaluate fluid balance  Outcome: Progressing     Problem: METABOLIC/FLUID AND ELECTROLYTES - ADULT  Goal: Electrolytes maintained within normal limits  Description: INTERVENTIONS:  - Monitor labs and rhythm and assess patient for signs and symptoms of electrolyte imbalances  - Administer electrolyte replacement as ordered  - Monitor response to electrolyte replacements, including rhythm and repeat lab results as appropriate  - Fluid restriction as ordered  - Instruct patient on fluid and nutrition restrictions as appropriate  Outcome: Progressing

## 2022-08-01 NOTE — ED QUICK NOTES
Orders for admission, patient is aware of plan and ready to go upstairs. Any questions, please call ED RN [place] Staff at [address] [place] [address].      Patient Covid vaccination status: Fully vaccinated     COVID Test Ordered in ED: Rapid SARS-CoV-2 by PCR neg    COVID Suspicion at Admission: N/A    Running Infusions:  None    Mental Status/LOC at time of transport: A/Ox4    Other pertinent information:   CIWA score: N/A   NIH score:  N/A

## 2022-08-02 ENCOUNTER — TELEPHONE (OUTPATIENT)
Dept: NEPHROLOGY | Facility: CLINIC | Age: 82
End: 2022-08-02

## 2022-08-02 VITALS
TEMPERATURE: 98 F | HEART RATE: 81 BPM | RESPIRATION RATE: 16 BRPM | SYSTOLIC BLOOD PRESSURE: 100 MMHG | DIASTOLIC BLOOD PRESSURE: 64 MMHG | OXYGEN SATURATION: 96 % | BODY MASS INDEX: 26 KG/M2 | WEIGHT: 142.31 LBS

## 2022-08-02 LAB
ANION GAP SERPL CALC-SCNC: 7 MMOL/L (ref 0–18)
BUN BLD-MCNC: 14 MG/DL (ref 7–18)
BUN/CREAT SERPL: 10 (ref 10–20)
CALCIUM BLD-MCNC: 8.8 MG/DL (ref 8.5–10.1)
CHLORIDE SERPL-SCNC: 95 MMOL/L (ref 98–112)
CO2 SERPL-SCNC: 26 MMOL/L (ref 21–32)
CREAT BLD-MCNC: 1.4 MG/DL
CREAT UR-SCNC: 24.7 MG/DL
GLUCOSE BLD-MCNC: 81 MG/DL (ref 70–99)
OSMOLALITY SERPL CALC.SUM OF ELEC: 266 MOSM/KG (ref 275–295)
POTASSIUM SERPL-SCNC: 4.3 MMOL/L (ref 3.5–5.1)
PROT UR-MCNC: 7.4 MG/DL
SODIUM SERPL-SCNC: 128 MMOL/L (ref 136–145)
URATE SERPL-MCNC: 6.2 MG/DL

## 2022-08-02 PROCEDURE — 99255 IP/OBS CONSLTJ NEW/EST HI 80: CPT | Performed by: INTERNAL MEDICINE

## 2022-08-02 RX ORDER — AMLODIPINE BESYLATE 5 MG/1
5 TABLET ORAL DAILY
Qty: 30 TABLET | Refills: 0 | Status: SHIPPED | OUTPATIENT
Start: 2022-08-02

## 2022-08-02 NOTE — CM/SW NOTE
08/02/22 1300   CM/SW Referral Data   Referral Source Physician   Reason for Referral Discharge planning;PHQ4/PHQ9   Informant Patient;Daughter   Pertinent Medical Hx   Does patient have an established PCP? Yes  Andrew Melgar)   Patient Info   Patient's Current Mental Status at Time of Assessment Alert;Oriented;Memory Impairments   Patient Communication Issues Language barrier  (Portuguese)   Patient's [address]   Patient lives with Daughter   Patient Status Prior to Admission   Independent with ADLs and Mobility Yes   Discharge Needs   Anticipated D/C needs To be determined     Pt discussed during nursing rounds. Dx hyponatremia. From home w/daughter, independent at baseline. PT/OT evals needed for dc recommendation, RN is aware. MDO received for PHQ4. Pt has hx of anxiety, currently on Xanax and Zoloft. Pt and daughter confirm patient is very anxious when ill and in the hospital. Pt and daughter refusing additional resources at this time. 1400: PT recommending HH w/PT. [facility] [place] referrals sent in [address] [place] [address] completed. List of accepting agencies will be needed for choice. 1600: No accepting [place] agency as of the time of this note. Pt has dc order and will dc home w/o reserved [facility] [place] provider. CM will continue [facility] search in [address] [place] [address] and follow up w/patient and daughter if agency is onbtained. Plan: Home w/daughter today. / to remain available for support and/or discharge planning.      JUSTIN Leyva    [phone]

## 2022-08-02 NOTE — PLAN OF CARE
Pt A/O x4, no acute changes and no pain overnight. Repeat sodium level 129, MD notified, IV fluids discontinued. Repeat BMP in AM. Call light within reach, bed alarm on, frequent rounding done. Problem: Patient Centered Care  Goal: Patient preferences are identified and integrated in the patient's plan of care  Description: Interventions:  - What would you like us to know as we care for you?  \"I speak Czech\"  - Provide timely, complete, and accurate information to patient/family  - Incorporate patient and family knowledge, values, beliefs, and cultural backgrounds into the planning and delivery of care  - Encourage patient/family to participate in care and decision-making at the level they choose  - Honor patient and family perspectives and choices  Outcome: Progressing     Problem: Patient/Family Goals  Goal: Patient/Family Long Term Goal  Description: Patient's Long Term Goal: \"I want to go home with my family\"    Interventions:  - IV fluids  -Antiemetics  - See additional Care Plan goals for specific interventions  Outcome: Progressing  Goal: Patient/Family Short Term Goal  Description: Patient's Short Term Goal: \"I am hungry\"    Interventions:   - Indonesian menu  -[place] to environment  - See additional Care Plan goals for specific interventions  Outcome: Progressing     Problem: PAIN - ADULT  Goal: Verbalizes/displays adequate comfort level or patient's stated pain goal  Description: INTERVENTIONS:  - Encourage pt to monitor pain and request assistance  - Assess pain using appropriate pain scale  - Administer analgesics based on type and severity of pain and evaluate response  - Implement non-pharmacological measures as appropriate and evaluate response  - Consider cultural and social influences on pain and pain management  - Manage/alleviate anxiety  - Utilize distraction and/or relaxation techniques  - Monitor for opioid side effects  - Notify MD/LIP if interventions unsuccessful or patient reports new pain  - Anticipate increased pain with activity and pre-medicate as appropriate  Outcome: Progressing     Problem: RISK FOR INFECTION - ADULT  Goal: Absence of fever/infection during anticipated neutropenic period  Description: INTERVENTIONS  - Monitor WBC  - Administer growth factors as ordered  - Implement neutropenic guidelines  Outcome: Progressing     Problem: SAFETY ADULT - FALL  Goal: Free from fall injury  Description: INTERVENTIONS:  - Assess pt frequently for physical needs  - Identify cognitive and physical deficits and behaviors that affect risk of falls.   - [place] fall precautions as indicated by assessment.  - Educate pt/family on patient safety including physical limitations  - Instruct pt to call for assistance with activity based on assessment  - Modify environment to reduce risk of injury  - Provide assistive devices as appropriate  - Consider OT/PT consult to assist with strengthening/mobility  - Encourage toileting schedule  Outcome: Progressing     Problem: DISCHARGE PLANNING  Goal: Discharge to home or other facility with appropriate resources  Description: INTERVENTIONS:  - Identify barriers to discharge w/pt and caregiver  - Include patient/family/discharge partner in discharge planning  - Arrange for needed discharge resources and transportation as appropriate  - Identify discharge learning needs (meds, wound care, etc)  - Arrange for interpreters to assist at discharge as needed  - Consider post-discharge preferences of patient/family/discharge partner  - Complete POLST form as appropriate  - Assess patient's ability to be responsible for managing their own health  - Refer to Case Management Department for coordinating discharge planning if the patient needs post-hospital services based on physician/LIP order or complex needs related to functional status, cognitive ability or social support system  Outcome: Progressing     Problem: Altered Communication/Language Barrier  Goal: Patient/Family is able to understand and participate in their care  Description: Interventions:  - Assess communication ability and preferred communication style  - Implement communication aides and strategies  - Use visual cues when possible  - Listen attentively, be patient, do not interrupt  - Minimize distractions  - Allow time for understanding and response  - Establish method for patient to ask for assistance (call light)  - Provide an  as needed  - Communicate barriers and strategies to overcome with those who interact with patient  Outcome: Progressing     Problem: GASTROINTESTINAL - ADULT  Goal: Minimal or absence of nausea and vomiting  Description: INTERVENTIONS:  - Maintain adequate hydration with IV or PO as ordered and tolerated  - Nasogastric tube to low intermittent suction as ordered  - Evaluate effectiveness of ordered antiemetic medications  - Provide nonpharmacologic comfort measures as appropriate  - Advance diet as tolerated, if ordered  - Obtain nutritional consult as needed  - Evaluate fluid balance  Outcome: Progressing     Problem: METABOLIC/FLUID AND ELECTROLYTES - ADULT  Goal: Electrolytes maintained within normal limits  Description: INTERVENTIONS:  - Monitor labs and rhythm and assess patient for signs and symptoms of electrolyte imbalances  - Administer electrolyte replacement as ordered  - Monitor response to electrolyte replacements, including rhythm and repeat lab results as appropriate  - Fluid restriction as ordered  - Instruct patient on fluid and nutrition restrictions as appropriate  Outcome: Progressing

## 2022-08-02 NOTE — PLAN OF CARE
Problem: Patient Centered Care  Goal: Patient preferences are identified and integrated in the patient's plan of care  Description: Interventions:  - What would you like us to know as we care for you?  \"I speak Occitan\"  - Provide timely, complete, and accurate information to patient/family  - Incorporate patient and family knowledge, values, beliefs, and cultural backgrounds into the planning and delivery of care  - Encourage patient/family to participate in care and decision-making at the level they choose  - Honor patient and family perspectives and choices  Outcome: Adequate for Discharge     Problem: Patient/Family Goals  Goal: Patient/Family Long Term Goal  Description: Patient's Long Term Goal: \"I want to go home with my family\"    Interventions:  - IV fluids  -Antiemetics  - See additional Care Plan goals for specific interventions  Outcome: Adequate for Discharge  Goal: Patient/Family Short Term Goal  Description: Patient's Short Term Goal: \"I am hungry\"    Interventions:   - [place] menu  -[place] to environment  - See additional Care Plan goals for specific interventions  Outcome: Adequate for Discharge     Problem: PAIN - ADULT  Goal: Verbalizes/displays adequate comfort level or patient's stated pain goal  Description: INTERVENTIONS:  - Encourage pt to monitor pain and request assistance  - Assess pain using appropriate pain scale  - Administer analgesics based on type and severity of pain and evaluate response  - Implement non-pharmacological measures as appropriate and evaluate response  - Consider cultural and social influences on pain and pain management  - Manage/alleviate anxiety  - Utilize distraction and/or relaxation techniques  - Monitor for opioid side effects  - Notify MD/LIP if interventions unsuccessful or patient reports new pain  - Anticipate increased pain with activity and pre-medicate as appropriate  Outcome: Adequate for Discharge     Problem: RISK FOR INFECTION - ADULT  Goal: Absence of fever/infection during anticipated neutropenic period  Description: INTERVENTIONS  - Monitor WBC  - Administer growth factors as ordered  - Implement neutropenic guidelines  Outcome: Adequate for Discharge     Problem: SAFETY ADULT - FALL  Goal: Free from fall injury  Description: INTERVENTIONS:  - Assess pt frequently for physical needs  - Identify cognitive and physical deficits and behaviors that affect risk of falls.   - [place] fall precautions as indicated by assessment.  - Educate pt/family on patient safety including physical limitations  - Instruct pt to call for assistance with activity based on assessment  - Modify environment to reduce risk of injury  - Provide assistive devices as appropriate  - Consider OT/PT consult to assist with strengthening/mobility  - Encourage toileting schedule  Outcome: Adequate for Discharge     Problem: DISCHARGE PLANNING  Goal: Discharge to home or other facility with appropriate resources  Description: INTERVENTIONS:  - Identify barriers to discharge w/pt and caregiver  - Include patient/family/discharge partner in discharge planning  - Arrange for needed discharge resources and transportation as appropriate  - Identify discharge learning needs (meds, wound care, etc)  - Arrange for interpreters to assist at discharge as needed  - Consider post-discharge preferences of patient/family/discharge partner  - Complete POLST form as appropriate  - Assess patient's ability to be responsible for managing their own health  - Refer to Case Management Department for coordinating discharge planning if the patient needs post-hospital services based on physician/LIP order or complex needs related to functional status, cognitive ability or social support system  Outcome: Adequate for Discharge     Problem: Altered Communication/Language Barrier  Goal: Patient/Family is able to understand and participate in their care  Description: Interventions:  - Assess communication ability and preferred communication style  - Implement communication aides and strategies  - Use visual cues when possible  - Listen attentively, be patient, do not interrupt  - Minimize distractions  - Allow time for understanding and response  - Establish method for patient to ask for assistance (call light)  - Provide an  as needed  - Communicate barriers and strategies to overcome with those who interact with patient  Outcome: Adequate for Discharge     Problem: GASTROINTESTINAL - ADULT  Goal: Minimal or absence of nausea and vomiting  Description: INTERVENTIONS:  - Maintain adequate hydration with IV or PO as ordered and tolerated  - Nasogastric tube to low intermittent suction as ordered  - Evaluate effectiveness of ordered antiemetic medications  - Provide nonpharmacologic comfort measures as appropriate  - Advance diet as tolerated, if ordered  - Obtain nutritional consult as needed  - Evaluate fluid balance  Outcome: Adequate for Discharge     Problem: METABOLIC/FLUID AND ELECTROLYTES - ADULT  Goal: Electrolytes maintained within normal limits  Description: INTERVENTIONS:  - Monitor labs and rhythm and assess patient for signs and symptoms of electrolyte imbalances  - Administer electrolyte replacement as ordered  - Monitor response to electrolyte replacements, including rhythm and repeat lab results as appropriate  - Fluid restriction as ordered  - Instruct patient on fluid and nutrition restrictions as appropriate  Outcome: Adequate for Discharge   Pt okay to be discharged per MD order, all discharge instructions discussed with pt's daughter/POA at bedside with language line  assistance, all questions answered, peripheral IV and remote tele removed, pt assisted to hospital entrance via wheelchair with all belongings by PCT and picked up by his daughter, stable at time of discharge.

## 2022-08-03 NOTE — PAYOR COMM NOTE
Discharge Notification    Patient Name: Josselyn Shaker: Darwin Velez #: [id number]  Authorization Number: [id number]  Admit Date/Time: 8/1/2022 10:11 AM  Discharge Date/Time: 8/2/2022 3:55 PM

## 2022-08-04 NOTE — PAYOR COMM NOTE
Discharge Notification    Patient Name: Sumeet Serrano: Darwin Velez #: [id number]  Authorization Number: [id number]  Admit Date/Time: 8/1/2022 10:11 AM  Discharge Date/Time: 8/2/2022 3:55 PM      STATUS CHANGED TO OBSERVATION

## 2022-08-11 ENCOUNTER — OFFICE VISIT (OUTPATIENT)
Dept: FAMILY MEDICINE CLINIC | Facility: CLINIC | Age: 82
End: 2022-08-11
Payer: MEDICAID

## 2022-08-11 ENCOUNTER — TELEPHONE (OUTPATIENT)
Dept: FAMILY MEDICINE CLINIC | Facility: CLINIC | Age: 82
End: 2022-08-11

## 2022-08-11 VITALS
SYSTOLIC BLOOD PRESSURE: 119 MMHG | HEART RATE: 90 BPM | BODY MASS INDEX: 25.73 KG/M2 | DIASTOLIC BLOOD PRESSURE: 74 MMHG | HEIGHT: 62 IN | WEIGHT: 139.81 LBS

## 2022-08-11 DIAGNOSIS — H40.9 GLAUCOMA, UNSPECIFIED GLAUCOMA TYPE, UNSPECIFIED LATERALITY: ICD-10-CM

## 2022-08-11 DIAGNOSIS — E87.1 HYPONATREMIA: ICD-10-CM

## 2022-08-11 DIAGNOSIS — R35.1 BENIGN PROSTATIC HYPERPLASIA WITH NOCTURIA: ICD-10-CM

## 2022-08-11 DIAGNOSIS — I70.90 ATHEROSCLEROSIS: ICD-10-CM

## 2022-08-11 DIAGNOSIS — I10 PRIMARY HYPERTENSION: ICD-10-CM

## 2022-08-11 DIAGNOSIS — N20.0 RENAL STONE: ICD-10-CM

## 2022-08-11 DIAGNOSIS — N18.31 STAGE 3A CHRONIC KIDNEY DISEASE (HCC): Primary | ICD-10-CM

## 2022-08-11 DIAGNOSIS — R91.1 LUNG NODULE: ICD-10-CM

## 2022-08-11 DIAGNOSIS — M15.9 PRIMARY OSTEOARTHRITIS INVOLVING MULTIPLE JOINTS: ICD-10-CM

## 2022-08-11 DIAGNOSIS — N40.1 BENIGN PROSTATIC HYPERPLASIA WITH NOCTURIA: ICD-10-CM

## 2022-08-11 DIAGNOSIS — E78.00 PURE HYPERCHOLESTEROLEMIA: ICD-10-CM

## 2022-08-11 PROCEDURE — 1111F DSCHRG MED/CURRENT MED MERGE: CPT | Performed by: FAMILY MEDICINE

## 2022-08-11 PROCEDURE — 90732 PPSV23 VACC 2 YRS+ SUBQ/IM: CPT | Performed by: FAMILY MEDICINE

## 2022-08-11 PROCEDURE — 3078F DIAST BP <80 MM HG: CPT | Performed by: FAMILY MEDICINE

## 2022-08-11 PROCEDURE — 90471 IMMUNIZATION ADMIN: CPT | Performed by: FAMILY MEDICINE

## 2022-08-11 PROCEDURE — 99204 OFFICE O/P NEW MOD 45 MIN: CPT | Performed by: FAMILY MEDICINE

## 2022-08-11 PROCEDURE — 3008F BODY MASS INDEX DOCD: CPT | Performed by: FAMILY MEDICINE

## 2022-08-11 PROCEDURE — 3074F SYST BP LT 130 MM HG: CPT | Performed by: FAMILY MEDICINE

## 2022-08-11 RX ORDER — NAPROXEN 250 MG/1
250 TABLET ORAL 2 TIMES DAILY WITH MEALS
Qty: 60 TABLET | Refills: 2 | Status: SHIPPED | OUTPATIENT
Start: 2022-08-11

## 2022-08-11 RX ORDER — TAMSULOSIN HYDROCHLORIDE 0.4 MG/1
0.4 CAPSULE ORAL DAILY
Qty: 90 CAPSULE | Refills: 3 | Status: SHIPPED | OUTPATIENT
Start: 2022-08-11 | End: 2022-09-10

## 2022-08-11 RX ORDER — OMEPRAZOLE 20 MG/1
CAPSULE, DELAYED RELEASE ORAL
Qty: 90 CAPSULE | Refills: 2 | Status: SHIPPED | OUTPATIENT
Start: 2022-08-11

## 2022-08-11 RX ORDER — FAMOTIDINE 20 MG/1
TABLET, FILM COATED ORAL
COMMUNITY
Start: 2022-07-22

## 2022-08-11 NOTE — TELEPHONE ENCOUNTER
S/w pharmacist, per Dr. Hanson Fear ok to clear drug interaction between naproxen and sertaline. Omeprazole was denied by insurance. Will require PA prior to medication being filled. Please advise.

## 2022-08-12 ENCOUNTER — LAB ENCOUNTER (OUTPATIENT)
Dept: LAB | Age: 82
End: 2022-08-12
Attending: FAMILY MEDICINE
Payer: MEDICAID

## 2022-08-12 DIAGNOSIS — N18.31 STAGE 3A CHRONIC KIDNEY DISEASE (HCC): ICD-10-CM

## 2022-08-12 DIAGNOSIS — E78.00 PURE HYPERCHOLESTEROLEMIA: ICD-10-CM

## 2022-08-12 DIAGNOSIS — R35.1 BENIGN PROSTATIC HYPERPLASIA WITH NOCTURIA: ICD-10-CM

## 2022-08-12 DIAGNOSIS — I10 PRIMARY HYPERTENSION: ICD-10-CM

## 2022-08-12 DIAGNOSIS — N40.1 BENIGN PROSTATIC HYPERPLASIA WITH NOCTURIA: ICD-10-CM

## 2022-08-12 LAB
ALBUMIN SERPL-MCNC: 3.5 G/DL (ref 3.4–5)
ALBUMIN/GLOB SERPL: 0.9 {RATIO} (ref 1–2)
ALP LIVER SERPL-CCNC: 101 U/L
ALT SERPL-CCNC: 39 U/L
ANION GAP SERPL CALC-SCNC: 7 MMOL/L (ref 0–18)
AST SERPL-CCNC: 24 U/L (ref 15–37)
BILIRUB SERPL-MCNC: 0.5 MG/DL (ref 0.1–2)
BILIRUB UR QL: NEGATIVE
BUN BLD-MCNC: 22 MG/DL (ref 7–18)
BUN/CREAT SERPL: 15.5 (ref 10–20)
CALCIUM BLD-MCNC: 9.3 MG/DL (ref 8.5–10.1)
CHLORIDE SERPL-SCNC: 104 MMOL/L (ref 98–112)
CHOLEST SERPL-MCNC: 129 MG/DL (ref ?–200)
CLARITY UR: CLEAR
CO2 SERPL-SCNC: 27 MMOL/L (ref 21–32)
COLOR UR: YELLOW
CREAT BLD-MCNC: 1.42 MG/DL
FASTING PATIENT LIPID ANSWER: YES
FASTING STATUS PATIENT QL REPORTED: YES
GFR SERPLBLD BASED ON 1.73 SQ M-ARVRAT: 49 ML/MIN/1.73M2 (ref 60–?)
GLOBULIN PLAS-MCNC: 3.8 G/DL (ref 2.8–4.4)
GLUCOSE BLD-MCNC: 88 MG/DL (ref 70–99)
GLUCOSE UR-MCNC: NEGATIVE MG/DL
HDLC SERPL-MCNC: 57 MG/DL (ref 40–59)
HGB UR QL STRIP.AUTO: NEGATIVE
KETONES UR-MCNC: NEGATIVE MG/DL
LDLC SERPL CALC-MCNC: 57 MG/DL (ref ?–100)
LEUKOCYTE ESTERASE UR QL STRIP.AUTO: NEGATIVE
NITRITE UR QL STRIP.AUTO: NEGATIVE
NONHDLC SERPL-MCNC: 72 MG/DL (ref ?–130)
OSMOLALITY SERPL CALC.SUM OF ELEC: 289 MOSM/KG (ref 275–295)
PH UR: 6 [PH] (ref 5–8)
POTASSIUM SERPL-SCNC: 4.2 MMOL/L (ref 3.5–5.1)
PROT SERPL-MCNC: 7.3 G/DL (ref 6.4–8.2)
PROT UR-MCNC: NEGATIVE MG/DL
PSA SERPL-MCNC: 0.79 NG/ML (ref ?–4)
SODIUM SERPL-SCNC: 138 MMOL/L (ref 136–145)
SP GR UR STRIP: >=1.03 (ref 1–1.03)
TRIGL SERPL-MCNC: 78 MG/DL (ref 30–149)
UROBILINOGEN UR STRIP-ACNC: 0.2
VLDLC SERPL CALC-MCNC: 11 MG/DL (ref 0–30)

## 2022-08-12 PROCEDURE — 80053 COMPREHEN METABOLIC PANEL: CPT

## 2022-08-12 PROCEDURE — 80061 LIPID PANEL: CPT

## 2022-08-12 PROCEDURE — 36415 COLL VENOUS BLD VENIPUNCTURE: CPT

## 2022-08-12 PROCEDURE — 84153 ASSAY OF PSA TOTAL: CPT

## 2022-08-12 PROCEDURE — 81003 URINALYSIS AUTO W/O SCOPE: CPT

## 2022-08-19 ENCOUNTER — OFFICE VISIT (OUTPATIENT)
Dept: NEPHROLOGY | Facility: CLINIC | Age: 82
End: 2022-08-19
Payer: MEDICAID

## 2022-08-19 VITALS
WEIGHT: 139 LBS | HEIGHT: 62 IN | HEART RATE: 98 BPM | DIASTOLIC BLOOD PRESSURE: 74 MMHG | SYSTOLIC BLOOD PRESSURE: 113 MMHG | RESPIRATION RATE: 16 BRPM | BODY MASS INDEX: 25.58 KG/M2

## 2022-08-19 DIAGNOSIS — N18.30 STAGE 3 CHRONIC KIDNEY DISEASE, UNSPECIFIED WHETHER STAGE 3A OR 3B CKD (HCC): Primary | ICD-10-CM

## 2022-08-19 PROCEDURE — 1111F DSCHRG MED/CURRENT MED MERGE: CPT | Performed by: INTERNAL MEDICINE

## 2022-08-19 PROCEDURE — 3008F BODY MASS INDEX DOCD: CPT | Performed by: INTERNAL MEDICINE

## 2022-08-19 PROCEDURE — 99215 OFFICE O/P EST HI 40 MIN: CPT | Performed by: INTERNAL MEDICINE

## 2022-08-19 PROCEDURE — 3078F DIAST BP <80 MM HG: CPT | Performed by: INTERNAL MEDICINE

## 2022-08-19 PROCEDURE — 3074F SYST BP LT 130 MM HG: CPT | Performed by: INTERNAL MEDICINE

## 2022-08-19 NOTE — PATIENT INSTRUCTIONS
Fluid restriction to 1.8 liters a day   Follow up 4 months   Lab tests prior to next visit  Take vitamin D3 at 2000 units a day   Stop naproxen

## 2022-08-26 ENCOUNTER — TELEPHONE (OUTPATIENT)
Dept: FAMILY MEDICINE CLINIC | Facility: CLINIC | Age: 82
End: 2022-08-26

## 2022-08-26 DIAGNOSIS — H40.9 GLAUCOMA, UNSPECIFIED GLAUCOMA TYPE, UNSPECIFIED LATERALITY: Primary | ICD-10-CM

## 2022-08-26 NOTE — TELEPHONE ENCOUNTER
Sonja, Patient's daughter on the phone is requesting referral.     Name of specialist and specialty department : Ophthalmology, Dr. Beth Mar  Reason for visit with the specialist: Follow up  Address of the specialist office: [facility] [address] [place] [address],[address] [place], [address] [facility] [address]  Appointment date: 9/7/2022     Sonja insisted they need a new referral      CSS informed patient the turnaround time for referral is 5-7 business days. Patient was informed to check their [employer] account for referral status.

## 2022-08-31 ENCOUNTER — NURSE TRIAGE (OUTPATIENT)
Dept: FAMILY MEDICINE CLINIC | Facility: CLINIC | Age: 82
End: 2022-08-31

## 2022-09-01 ENCOUNTER — OFFICE VISIT (OUTPATIENT)
Dept: FAMILY MEDICINE CLINIC | Facility: CLINIC | Age: 82
End: 2022-09-01
Payer: MEDICAID

## 2022-09-01 ENCOUNTER — LAB ENCOUNTER (OUTPATIENT)
Dept: LAB | Age: 82
End: 2022-09-01
Attending: NURSE PRACTITIONER
Payer: MEDICAID

## 2022-09-01 VITALS
DIASTOLIC BLOOD PRESSURE: 65 MMHG | HEIGHT: 62 IN | WEIGHT: 142 LBS | BODY MASS INDEX: 26.13 KG/M2 | SYSTOLIC BLOOD PRESSURE: 97 MMHG | HEART RATE: 93 BPM

## 2022-09-01 DIAGNOSIS — R10.84 GENERALIZED ABDOMINAL PAIN: ICD-10-CM

## 2022-09-01 DIAGNOSIS — R19.4 FREQUENT BOWEL MOVEMENTS: ICD-10-CM

## 2022-09-01 DIAGNOSIS — E87.1 HYPONATREMIA: ICD-10-CM

## 2022-09-01 DIAGNOSIS — N18.31 STAGE 3A CHRONIC KIDNEY DISEASE (HCC): ICD-10-CM

## 2022-09-01 DIAGNOSIS — R10.84 GENERALIZED ABDOMINAL PAIN: Primary | ICD-10-CM

## 2022-09-01 LAB
ANION GAP SERPL CALC-SCNC: 8 MMOL/L (ref 0–18)
BUN BLD-MCNC: 20 MG/DL (ref 7–18)
BUN/CREAT SERPL: 14 (ref 10–20)
CALCIUM BLD-MCNC: 9.1 MG/DL (ref 8.5–10.1)
CHLORIDE SERPL-SCNC: 101 MMOL/L (ref 98–112)
CO2 SERPL-SCNC: 26 MMOL/L (ref 21–32)
CREAT BLD-MCNC: 1.43 MG/DL
FASTING STATUS PATIENT QL REPORTED: NO
GFR SERPLBLD BASED ON 1.73 SQ M-ARVRAT: 49 ML/MIN/1.73M2 (ref 60–?)
GLUCOSE BLD-MCNC: 104 MG/DL (ref 70–99)
OSMOLALITY SERPL CALC.SUM OF ELEC: 283 MOSM/KG (ref 275–295)
POTASSIUM SERPL-SCNC: 4.5 MMOL/L (ref 3.5–5.1)
SODIUM SERPL-SCNC: 135 MMOL/L (ref 136–145)

## 2022-09-01 PROCEDURE — 36415 COLL VENOUS BLD VENIPUNCTURE: CPT

## 2022-09-01 PROCEDURE — 80048 BASIC METABOLIC PNL TOTAL CA: CPT

## 2022-09-01 RX ORDER — DICYCLOMINE HYDROCHLORIDE 10 MG/1
10 CAPSULE ORAL 4 TIMES DAILY
Qty: 40 CAPSULE | Refills: 3 | Status: SHIPPED | OUTPATIENT
Start: 2022-09-01 | End: 2022-09-11

## 2022-09-04 NOTE — ASSESSMENT & PLAN NOTE
Will try bentyl 10 mg three times per day as needed  Check bmp-stat to f/o hyponatremia as he was recently hospitalized. Reviewed ct scan abd and pelvis  Reviewed neprhology and Dr Marty Luther notes  Refer gastro    Discussed keeping diet light and staying well hydrated  Discussed w pt red flag symptoms that if they occur, pt should immediately go to ER. Pt and daughter states understanding.

## 2022-09-04 NOTE — ASSESSMENT & PLAN NOTE
Will try bentyl 10 mg three times per day as needed  Check bmp-stat to f/o hyponatremia as he was recently hospitalized. Reviewed ct scan abd and pelvis  Reviewed neprhology and Dr Theo Gutierrez notes  Refer gastro    Discussed keeping diet light and staying well hydrated  Discussed w pt red flag symptoms that if they occur, pt should immediately go to ER. Pt and daughter states understanding.

## 2022-09-04 NOTE — ASSESSMENT & PLAN NOTE
Due to recent hospitalization, will check bmp stat  Discussed w pt and daughter that I will call tonight if significant results are found and discussed treatment; if they do not hear from me tonight I will follow up tomorrow with no emergent results

## 2022-09-06 ENCOUNTER — OFFICE VISIT (OUTPATIENT)
Dept: PHYSICAL THERAPY | Age: 82
End: 2022-09-06
Attending: FAMILY MEDICINE
Payer: MEDICAID

## 2022-09-06 PROCEDURE — 97162 PT EVAL MOD COMPLEX 30 MIN: CPT

## 2022-09-06 PROCEDURE — 97530 THERAPEUTIC ACTIVITIES: CPT

## 2022-09-08 ENCOUNTER — OFFICE VISIT (OUTPATIENT)
Dept: PHYSICAL THERAPY | Age: 82
End: 2022-09-08
Attending: FAMILY MEDICINE
Payer: MEDICAID

## 2022-09-08 PROCEDURE — 97140 MANUAL THERAPY 1/> REGIONS: CPT

## 2022-09-08 PROCEDURE — 97110 THERAPEUTIC EXERCISES: CPT

## 2022-09-08 NOTE — PROGRESS NOTES
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 Jayla Medrano  Next MD visit:  Fall Risk: standard         Precautions:         Subjective: Pt states he does not have any pain this morning because he put gel on his low back. Pain level: 0/10 LBP  Objective: see flow sheet below for treatment       Assessment: Initiated gentle low back stretches and sciatic nerve glides. Provided pt with HEP and discussed walking program to improve activity tolerance. Goals:   1. Pt will demonstrate good understanding of proper posture and body mechanics to decrease pain and improve spinal safety. 2.   Pt will improve lumbar spine AROM flexion to [facility] with minimal pain and pulling to allow increase ease with bending forward to don shoes. 3.   Pt will report improved symptom centralization and absence of radicular symptoms for 3 consecutive days to improve function with ADL. 4.   Pt will have decreased paraspinal mm tension to tolerate standing >10 minutes for work and home activities   5. Pt will be able to sleep through the night without wakening due to LBP. 6.  Pt will be independent and compliant with comprehensive HEP to maintain progress achieved in PT     Plan: continue with POC to improve mobility  Date: 9/8/2022  TX#: 2/10 Date:                 TX#: 3/ Date:                 TX#: 4/ Date:                 TX#: 5/ Date:    Tx#: 6/   There ex:  nustep L3 x 6 min  LTR [id number] R/L  SKTC 10x R/L  Supine sciatic nerve glide 10x R/L  Piriformis stretch [id number] R/L         Manual therapy:  Prone lumbar paraspinal oscillations, MFR  Supine ITB stretch 10x R/L  Supine L long axis SLR                     HEP: 9/8/22- LTR, SKTC, supine sciatic nerve glide     Charges: TEx2, MTx1       Total Timed Treatment: 40 min  Total Treatment Time: 40 min

## 2022-09-13 ENCOUNTER — OFFICE VISIT (OUTPATIENT)
Dept: PHYSICAL THERAPY | Age: 82
End: 2022-09-13
Attending: FAMILY MEDICINE
Payer: MEDICAID

## 2022-09-13 PROCEDURE — 97110 THERAPEUTIC EXERCISES: CPT

## 2022-09-13 PROCEDURE — 97140 MANUAL THERAPY 1/> REGIONS: CPT

## 2022-09-13 NOTE — PROGRESS NOTES
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 Arcelia Muñoz MD visit:  Fall Risk: standard         Precautions:         Subjective: Pt states he is feeling a little better. Pain level: 0/10 LBP  Objective: see flow sheet below for treatment       Assessment: Reviewed and corrected form with HEP. Initiated glute strengthening. Goals:   1. Pt will demonstrate good understanding of proper posture and body mechanics to decrease pain and improve spinal safety. 2.   Pt will improve lumbar spine AROM flexion to [facility] with minimal pain and pulling to allow increase ease with bending forward to don shoes. 3.   Pt will report improved symptom centralization and absence of radicular symptoms for 3 consecutive days to improve function with ADL. 4.   Pt will have decreased paraspinal mm tension to tolerate standing >10 minutes for work and home activities   5. Pt will be able to sleep through the night without wakening due to LBP. 6.  Pt will be independent and compliant with comprehensive HEP to maintain progress achieved in PT     Plan: continue with POC to improve mobility  Date: 9/8/2022  TX#: 2/10 Date: 9/13/22          TX#: 3/10 Date:                 TX#: 4/ Date:                 TX#: 5/ Date:    Tx#: 6/   There ex:  nustep L3 x 6 min  LTR [id number] R/L  SKTC 10x R/L  Supine sciatic nerve glide 10x R/L  Piriformis stretch [id number] R/L   There ex:  nustep L5 x 10 min  SKTC 10x R/L  LTR 10x  Supine sciatic nerve glide 10x R/L  hooklying hip add ball squeeze [id number]  Bridge 10x2  sidelying clamshell 10x2 R/L  Seated lumbar trunk stretch w/SB 10xea        Manual therapy:  Prone lumbar paraspinal oscillations, MFR  Supine ITB stretch 10x R/L  Supine L long axis SLR Manual therapy:  Prone lumbar paraspinal MFR                    HEP: 9/8/22- LTR, SKTC, supine sciatic nerve glide     Charges: TEx2, MTx1       Total Timed Treatment: 40 min  Total Treatment Time: 40 min

## 2022-09-14 ENCOUNTER — APPOINTMENT (OUTPATIENT)
Dept: PHYSICAL THERAPY | Age: 82
End: 2022-09-14
Attending: FAMILY MEDICINE
Payer: MEDICAID

## 2022-09-14 ENCOUNTER — TELEPHONE (OUTPATIENT)
Dept: FAMILY MEDICINE CLINIC | Facility: CLINIC | Age: 82
End: 2022-09-14

## 2022-09-14 ENCOUNTER — OFFICE VISIT (OUTPATIENT)
Dept: FAMILY MEDICINE CLINIC | Facility: CLINIC | Age: 82
End: 2022-09-14
Payer: MEDICAID

## 2022-09-14 VITALS
SYSTOLIC BLOOD PRESSURE: 111 MMHG | HEIGHT: 65 IN | WEIGHT: 143 LBS | DIASTOLIC BLOOD PRESSURE: 73 MMHG | BODY MASS INDEX: 23.82 KG/M2 | HEART RATE: 92 BPM

## 2022-09-14 DIAGNOSIS — N20.0 RENAL STONE: ICD-10-CM

## 2022-09-14 DIAGNOSIS — R10.84 GENERALIZED ABDOMINAL PAIN: Primary | ICD-10-CM

## 2022-09-14 DIAGNOSIS — N40.1 BENIGN PROSTATIC HYPERPLASIA WITH NOCTURIA: ICD-10-CM

## 2022-09-14 DIAGNOSIS — R35.1 BENIGN PROSTATIC HYPERPLASIA WITH NOCTURIA: ICD-10-CM

## 2022-09-14 LAB
APPEARANCE: CLEAR
BILIRUBIN: NEGATIVE
GLUCOSE (URINE DIPSTICK): NEGATIVE MG/DL
KETONES (URINE DIPSTICK): NEGATIVE MG/DL
LEUKOCYTES: NEGATIVE
MULTISTIX EXPIRATION DATE: NORMAL DATE
MULTISTIX LOT#: NORMAL NUMERIC
NITRITE, URINE: NEGATIVE
OCCULT BLOOD: NEGATIVE
PH, URINE: 5.5 (ref 4.5–8)
PROTEIN (URINE DIPSTICK): NEGATIVE MG/DL
SPECIFIC GRAVITY: 1.02 (ref 1–1.03)
URINE-COLOR: YELLOW
UROBILINOGEN,SEMI-QN: 0.2 MG/DL (ref 0–1.9)

## 2022-09-14 PROCEDURE — 3074F SYST BP LT 130 MM HG: CPT | Performed by: FAMILY MEDICINE

## 2022-09-14 PROCEDURE — 3008F BODY MASS INDEX DOCD: CPT | Performed by: FAMILY MEDICINE

## 2022-09-14 PROCEDURE — 99213 OFFICE O/P EST LOW 20 MIN: CPT | Performed by: FAMILY MEDICINE

## 2022-09-14 PROCEDURE — 3078F DIAST BP <80 MM HG: CPT | Performed by: FAMILY MEDICINE

## 2022-09-14 PROCEDURE — 81002 URINALYSIS NONAUTO W/O SCOPE: CPT | Performed by: FAMILY MEDICINE

## 2022-09-14 RX ORDER — NAPROXEN 250 MG/1
TABLET ORAL
COMMUNITY
Start: 2022-09-09 | End: 2022-09-14

## 2022-09-14 RX ORDER — DUTASTERIDE 0.5 MG/1
0.5 CAPSULE, LIQUID FILLED ORAL DAILY
Qty: 90 CAPSULE | Refills: 3 | Status: SHIPPED | OUTPATIENT
Start: 2022-09-14 | End: 2023-09-09

## 2022-09-14 RX ORDER — TAMSULOSIN HYDROCHLORIDE 0.4 MG/1
CAPSULE ORAL
COMMUNITY

## 2022-09-14 RX ORDER — DICYCLOMINE HYDROCHLORIDE 10 MG/1
10 CAPSULE ORAL
Qty: 40 CAPSULE | Refills: 1 | Status: SHIPPED | OUTPATIENT
Start: 2022-09-14 | End: 2022-09-24

## 2022-09-14 RX ORDER — TAMSULOSIN HYDROCHLORIDE 0.4 MG/1
0.8 CAPSULE ORAL DAILY
Qty: 180 CAPSULE | Refills: 1 | Status: SHIPPED | OUTPATIENT
Start: 2022-09-14 | End: 2022-10-14

## 2022-09-15 ENCOUNTER — HOSPITAL ENCOUNTER (OUTPATIENT)
Dept: CT IMAGING | Age: 82
Discharge: HOME | End: 2022-09-15
Attending: FAMILY MEDICINE

## 2022-09-15 ENCOUNTER — ORDER TRANSCRIPTION (OUTPATIENT)
Dept: ADMINISTRATIVE | Facility: HOSPITAL | Age: 82
End: 2022-09-15

## 2022-09-15 ENCOUNTER — OFFICE VISIT (OUTPATIENT)
Dept: PHYSICAL THERAPY | Age: 82
End: 2022-09-15
Attending: FAMILY MEDICINE
Payer: MEDICAID

## 2022-09-15 DIAGNOSIS — Z13.9 ENCOUNTER FOR SCREENING: Primary | ICD-10-CM

## 2022-09-15 DIAGNOSIS — I70.90 ATHEROSCLEROSIS: ICD-10-CM

## 2022-09-15 PROCEDURE — 97140 MANUAL THERAPY 1/> REGIONS: CPT

## 2022-09-15 PROCEDURE — 97110 THERAPEUTIC EXERCISES: CPT

## 2022-09-15 NOTE — TELEPHONE ENCOUNTER
Denial letter has been sent to the office, triage support does not see this letter. Please choose alternative and advise of next steps.

## 2022-09-15 NOTE — PROGRESS NOTES
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 Juliette Muñoz MD visit:  Fall Risk: standard         Precautions:         Subjective: Pt states he has been having LBP but less than before. Pain level: 2/10 LBP  Objective: see flow sheet below for treatment       Assessment: Continued with hip and core strengthening with verbal and tactile cues to slow down. Goals:   1. Pt will demonstrate good understanding of proper posture and body mechanics to decrease pain and improve spinal safety. 2.   Pt will improve lumbar spine AROM flexion to [facility] with minimal pain and pulling to allow increase ease with bending forward to don shoes. 3.   Pt will report improved symptom centralization and absence of radicular symptoms for 3 consecutive days to improve function with ADL. 4.   Pt will have decreased paraspinal mm tension to tolerate standing >10 minutes for work and home activities   5. Pt will be able to sleep through the night without wakening due to LBP. 6.  Pt will be independent and compliant with comprehensive HEP to maintain progress achieved in PT     Plan: continue with POC to improve mobility  Date: 9/8/2022  TX#: 2/10 Date: 9/13/22          TX#: 3/10 Date: 9/15/22                TX#: 4/10 Date:                 TX#: 5/ Date:    Tx#: 6/   There ex:  nustep L3 x 6 min  LTR [id number] R/L  SKTC 10x R/L  Supine sciatic nerve glide 10x R/L  Piriformis stretch [id number] R/L   There ex:  nustep L5 x 10 min  SKTC 10x R/L  LTR 10x  Supine sciatic nerve glide 10x R/L  hooklying hip add ball squeeze [id number]  Bridge 10x2  sidelying clamshell 10x2 R/L  Seated lumbar trunk stretch w/SB 10xea   There ex:  nustep L5 x 10 min   Shuttle 4B 15x DL  LTR 10x  Bridge 10x2  hooklying hip add ball squeeze 10x2  sidelying clamshell 10x2 R/L       Manual therapy:  Prone lumbar paraspinal oscillations, MFR  Supine ITB stretch 10x R/L  Supine L long axis SLR Manual therapy:  Prone lumbar paraspinal MFR Manual therapy:  Prone lumbar/thoracic paraspinal MFR                   HEP: 9/8/22- LTR, SKTC, supine sciatic nerve glide     Charges: TEx2, MTx1       Total Timed Treatment: 40 min  Total Treatment Time: 40 min

## 2022-09-15 NOTE — PROGRESS NOTES
Date of Service 9/15/2022    Nilton Fuentes  Date of Birth 6/13/1940    Patient Age: 79year old    PCP: Conrad Bose MD  [address] [place], [place] [address]    Consult Type   used with daughter and patient     Type Scan/Screening: Heart Scan  Preliminary Heart Scan Score: 143                Body Mass Index  BMI 23    Lipid Profile  Cholesterol: 129, done on 8/12/2022. HDL Cholesterol: 57, done on 8/12/2022. LDL Cholesterol: 57, done on 8/12/2022. TriGlycerides 78, done on 8/12/2022. Glucose 84 done on 8/12/2022    Nurse Review  Risk factor information and results reviewed with Nurse: Yes    Recommended Follow Up:  Consult your physician regarding[de-identified] Final Heart Scan Report    Free PV Screening offered to patient. Recommendations for Change:  Nutrition Changes: Low Saturated Fat;Low Fat Dairy; Low Salt Eating; Increase Fiber     Exercise: Enhance Current Program     Weight Management: Maintain Current Weight     Repeat Heart Scan: Discuss with your Physician          Saira Recommended Resources:           Nixon Grey RN        Please Contact the Nurse Heart Line with any Questions or Concerns [phone].

## 2022-09-16 RX ORDER — FINASTERIDE 5 MG/1
5 TABLET, FILM COATED ORAL DAILY
Qty: 90 TABLET | Refills: 3 | Status: SHIPPED | OUTPATIENT
Start: 2022-09-16 | End: 2023-09-11

## 2022-09-20 ENCOUNTER — OFFICE VISIT (OUTPATIENT)
Dept: PHYSICAL THERAPY | Age: 82
End: 2022-09-20
Attending: FAMILY MEDICINE
Payer: MEDICAID

## 2022-09-20 DIAGNOSIS — M15.9 PRIMARY OSTEOARTHRITIS INVOLVING MULTIPLE JOINTS: ICD-10-CM

## 2022-09-20 PROCEDURE — 97110 THERAPEUTIC EXERCISES: CPT

## 2022-09-20 PROCEDURE — 97140 MANUAL THERAPY 1/> REGIONS: CPT

## 2022-09-20 NOTE — PROGRESS NOTES
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 Heidy Lozano  Next MD visit:  Fall Risk: standard         Precautions:         Subjective: Pt states that he is feeling ok today. He has good days and bad days. Pain level: 2/10 LBP  Objective: see flow sheet below for treatment       Assessment: Progressed to standing hip strengthening exercises. Goals:   1. Pt will demonstrate good understanding of proper posture and body mechanics to decrease pain and improve spinal safety. 2.   Pt will improve lumbar spine AROM flexion to [facility] with minimal pain and pulling to allow increase ease with bending forward to don shoes. 3.   Pt will report improved symptom centralization and absence of radicular symptoms for 3 consecutive days to improve function with ADL. 4.   Pt will have decreased paraspinal mm tension to tolerate standing >10 minutes for work and home activities   5. Pt will be able to sleep through the night without wakening due to LBP. 6.  Pt will be independent and compliant with comprehensive HEP to maintain progress achieved in PT     Plan: continue with POC to improve mobility  Date: 9/8/2022  TX#: 2/10 Date: 9/13/22          TX#: 3/10 Date: 9/15/22                TX#: 4/10 Date: 9/20/22                TX#: 5/10 Date:    Tx#: 6/   There ex:  nustep L3 x 6 min  LTR [id number] R/L  SKTC 10x R/L  Supine sciatic nerve glide 10x R/L  Piriformis stretch [id number] R/L   There ex:  nustep L5 x 10 min  SKTC 10x R/L  LTR 10x  Supine sciatic nerve glide 10x R/L  hooklying hip add ball squeeze [id number]  Bridge 10x2  sidelying clamshell 10x2 R/L  Seated lumbar trunk stretch w/SB 10xea   There ex:  nustep L5 x 10 min   Shuttle 4B 15x DL  LTR 10x  Bridge 10x2  hooklying hip add ball squeeze 10x2  sidelying clamshell 10x2 R/L   There ex:  nustep L5 x 10 min   SB stretch 3 ways [id number]   Standing hip abd/ext 10xea R/L  Standing hip drive 97S R/L  sidelying clamshell 15x R/L  sidelying hip abd 10x R/L      Manual therapy:  Prone lumbar paraspinal oscillations, MFR  Supine ITB stretch 10x R/L  Supine L long axis SLR Manual therapy:  Prone lumbar paraspinal MFR Manual therapy:  Prone lumbar/thoracic paraspinal MFR Manual therapy:  Prone lumbar/thoracic paraspinal MFR                  HEP: 9/8/22- LTR, SKTC, supine sciatic nerve glide     Charges: TEx2, MTx1       Total Timed Treatment: 40 min  Total Treatment Time: 40 min

## 2022-09-22 ENCOUNTER — OFFICE VISIT (OUTPATIENT)
Dept: PHYSICAL THERAPY | Age: 82
End: 2022-09-22
Attending: FAMILY MEDICINE
Payer: MEDICAID

## 2022-09-22 DIAGNOSIS — M15.9 PRIMARY OSTEOARTHRITIS INVOLVING MULTIPLE JOINTS: ICD-10-CM

## 2022-09-22 PROCEDURE — 97110 THERAPEUTIC EXERCISES: CPT

## 2022-09-22 PROCEDURE — 97140 MANUAL THERAPY 1/> REGIONS: CPT

## 2022-09-22 NOTE — PROGRESS NOTES
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 Rene Muñoz MD visit:  Fall Risk: standard         Precautions:         Subjective: Pt states that he is feeling ok today. He states that he has pain in left leg today. Pain level: 2/10 LBP  Objective: see flow sheet below for treatment   BP: 141/86         123/85    Assessment: Pt initially c/o dizziness but after manual therapy and relaxation, BP decreased and able to complete exercises. Pt's daughter reports that pt has anxiety and has expressed he does not want to continue with PT anymore. Goals:   1. Pt will demonstrate good understanding of proper posture and body mechanics to decrease pain and improve spinal safety. 2.   Pt will improve lumbar spine AROM flexion to [facility] with minimal pain and pulling to allow increase ease with bending forward to don shoes. 3.   Pt will report improved symptom centralization and absence of radicular symptoms for 3 consecutive days to improve function with ADL. 4.   Pt will have decreased paraspinal mm tension to tolerate standing >10 minutes for work and home activities   5. Pt will be able to sleep through the night without wakening due to LBP.    6.  Pt will be independent and compliant with comprehensive HEP to maintain progress achieved in PT     Plan: continue with POC to improve mobility  Date: 9/8/2022  TX#: 2/10 Date: 9/13/22          TX#: 3/10 Date: 9/15/22                TX#: 4/10 Date: 9/20/22                TX#: 5/10 Date: 9/22/22  Tx#: 6/10   There ex:  nustep L3 x 6 min  LTR [id number] R/L  SKTC 10x R/L  Supine sciatic nerve glide 10x R/L  Piriformis stretch [id number] R/L   There ex:  nustep L5 x 10 min  SKTC 10x R/L  LTR 10x  Supine sciatic nerve glide 10x R/L  hooklying hip add ball squeeze [id number]  Bridge 10x2  sidelying clamshell 10x2 R/L  Seated lumbar trunk stretch w/SB 10xea There ex:  nustep L5 x 10 min   Shuttle 4B 15x DL  LTR 10x  Bridge 10x2  hooklying hip add ball squeeze 10x2  sidelying clamshell 10x2 R/L   There ex:  nustep L5 x 10 min   SB stretch 3 ways [id number]   Standing hip abd/ext 10xea R/L  Standing hip drive 37A R/L  sidelying clamshell 15x R/L  sidelying hip abd 10x R/L   There ex:  nustep L5 x 6 min  Standing hip abd/ext 10xea R/L  Sit to stand 10x   Shuttle 4B 20x DL     Manual therapy:  Prone lumbar paraspinal oscillations, MFR  Supine ITB stretch 10x R/L  Supine L long axis SLR Manual therapy:  Prone lumbar paraspinal MFR Manual therapy:  Prone lumbar/thoracic paraspinal MFR Manual therapy:  Prone lumbar/thoracic paraspinal MFR Manual therapy:  Prone lumbar/thoracic paraspinal MFR                 HEP: 9/8/22- LTR, SKTC, supine sciatic nerve glide     Charges: TEx2, MTx1       Total Timed Treatment: 40 min  Total Treatment Time: 40 min

## 2022-09-27 ENCOUNTER — APPOINTMENT (OUTPATIENT)
Dept: PHYSICAL THERAPY | Age: 82
End: 2022-09-27
Attending: FAMILY MEDICINE
Payer: MEDICAID

## 2022-09-29 ENCOUNTER — OFFICE VISIT (OUTPATIENT)
Dept: PHYSICAL THERAPY | Age: 82
End: 2022-09-29
Attending: FAMILY MEDICINE
Payer: MEDICAID

## 2022-09-29 DIAGNOSIS — M15.9 PRIMARY OSTEOARTHRITIS INVOLVING MULTIPLE JOINTS: ICD-10-CM

## 2022-09-29 PROCEDURE — 97140 MANUAL THERAPY 1/> REGIONS: CPT

## 2022-09-29 PROCEDURE — 97110 THERAPEUTIC EXERCISES: CPT

## 2022-09-29 NOTE — PROGRESS NOTES
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 Bishop Conner Muñoz MD visit:  Fall Risk: standard         Precautions:         Subjective: Pt states that he is feeling better and has no pain today. Per pt's daughter, pt has not complained of pain this past week and he has been walking everyday. Pain level: 0/10 LBP  Objective: see flow sheet below for treatment       Assessment: Progressed hip strengthening and provided pt with additional HEP. Goals:   1. Pt will demonstrate good understanding of proper posture and body mechanics to decrease pain and improve spinal safety. -MET  2. Pt will improve lumbar spine AROM flexion to [facility] with minimal pain and pulling to allow increase ease with bending forward to don shoes. 3.   Pt will report improved symptom centralization and absence of radicular symptoms for 3 consecutive days to improve function with ADL. -MET  4. Pt will have decreased paraspinal mm tension to tolerate standing >10 minutes for work and home activities   5. Pt will be able to sleep through the night without wakening due to LBP.    6.  Pt will be independent and compliant with comprehensive HEP to maintain progress achieved in PT- MET    Plan: continue with POC to improve mobility  Date: 9/8/2022  TX#: 2/10 Date: 9/13/22          TX#: 3/10 Date: 9/15/22                TX#: 4/10 Date: 9/20/22                TX#: 5/10 Date: 9/22/22  Tx#: 6/10 Date: 9/29/22  Tx: 7/10   There ex:  nustep L3 x 6 min  LTR [id number] R/L  SKTC 10x R/L  Supine sciatic nerve glide 10x R/L  Piriformis stretch [id number] R/L   There ex:  nustep L5 x 10 min  SKTC 10x R/L  LTR 10x  Supine sciatic nerve glide 10x R/L  hooklying hip add ball squeeze [id number]  Bridge 10x2  sidelying clamshell 10x2 R/L  Seated lumbar trunk stretch w/SB 10xea   There ex:  nustep L5 x 10 min   Shuttle 4B 15x DL  LTR 10x  Bridge 10x2  hooklying hip add ball squeeze 10x2  sidelying clamshell 10x2 R/L   There ex:  nustep L5 x 10 min   SB stretch 3 ways [id number]   Standing hip abd/ext 10xea R/L  Standing hip drive 10G R/L  sidelying clamshell 15x R/L  sidelying hip abd 10x R/L   There ex:  nustep L5 x 6 min  Standing hip abd/ext 10xea R/L  Sit to stand 10x   Shuttle 4B 20x DL   There ex:  nustep L6 x 10 min  Standing hip abd/ext yellow TB 10x2 ea R/L  Shuttle 5B 20x DL  Shuttle 3B 20x R/L  sidelying hip abd 15x R/L   Manual therapy:  Prone lumbar paraspinal oscillations, MFR  Supine ITB stretch 10x R/L  Supine L long axis SLR Manual therapy:  Prone lumbar paraspinal MFR Manual therapy:  Prone lumbar/thoracic paraspinal MFR Manual therapy:  Prone lumbar/thoracic paraspinal MFR Manual therapy:  Prone lumbar/thoracic paraspinal MFR Manual therapy:  Supine hamstring stretch  Prone lumbar/thoracic paraspinal MFR                    HEP: 9/8/22- LTR, SKTC, supine sciatic nerve glide           9/29/22- yellow TB hip abd/ext     Charges: TEx2, MTx1       Total Timed Treatment: 40 min  Total Treatment Time: 40 min

## 2022-10-03 ENCOUNTER — OFFICE VISIT (OUTPATIENT)
Dept: FAMILY MEDICINE CLINIC | Facility: CLINIC | Age: 82
End: 2022-10-03
Payer: MEDICAID

## 2022-10-03 ENCOUNTER — TELEPHONE (OUTPATIENT)
Dept: FAMILY MEDICINE CLINIC | Facility: CLINIC | Age: 82
End: 2022-10-03

## 2022-10-03 VITALS
WEIGHT: 145.63 LBS | HEIGHT: 65 IN | HEART RATE: 91 BPM | DIASTOLIC BLOOD PRESSURE: 69 MMHG | SYSTOLIC BLOOD PRESSURE: 95 MMHG | BODY MASS INDEX: 24.26 KG/M2

## 2022-10-03 DIAGNOSIS — R51.9 NONINTRACTABLE EPISODIC HEADACHE, UNSPECIFIED HEADACHE TYPE: ICD-10-CM

## 2022-10-03 DIAGNOSIS — I10 PRIMARY HYPERTENSION: ICD-10-CM

## 2022-10-03 DIAGNOSIS — I25.10 CORONARY ARTERY DISEASE INVOLVING NATIVE HEART WITHOUT ANGINA PECTORIS, UNSPECIFIED VESSEL OR LESION TYPE: Primary | ICD-10-CM

## 2022-10-03 DIAGNOSIS — N18.31 STAGE 3A CHRONIC KIDNEY DISEASE (HCC): ICD-10-CM

## 2022-10-03 PROCEDURE — 90471 IMMUNIZATION ADMIN: CPT | Performed by: FAMILY MEDICINE

## 2022-10-03 PROCEDURE — 3008F BODY MASS INDEX DOCD: CPT | Performed by: FAMILY MEDICINE

## 2022-10-03 PROCEDURE — 3078F DIAST BP <80 MM HG: CPT | Performed by: FAMILY MEDICINE

## 2022-10-03 PROCEDURE — 99213 OFFICE O/P EST LOW 20 MIN: CPT | Performed by: FAMILY MEDICINE

## 2022-10-03 PROCEDURE — 90662 IIV NO PRSV INCREASED AG IM: CPT | Performed by: FAMILY MEDICINE

## 2022-10-03 PROCEDURE — 3074F SYST BP LT 130 MM HG: CPT | Performed by: FAMILY MEDICINE

## 2022-10-04 ENCOUNTER — OFFICE VISIT (OUTPATIENT)
Dept: PHYSICAL THERAPY | Age: 82
End: 2022-10-04
Attending: FAMILY MEDICINE
Payer: MEDICAID

## 2022-10-04 DIAGNOSIS — M15.9 PRIMARY OSTEOARTHRITIS INVOLVING MULTIPLE JOINTS: ICD-10-CM

## 2022-10-04 PROCEDURE — 97140 MANUAL THERAPY 1/> REGIONS: CPT

## 2022-10-04 PROCEDURE — 97110 THERAPEUTIC EXERCISES: CPT

## 2022-10-04 NOTE — PROGRESS NOTES
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 Juliette Muñoz MD visit:  Fall Risk: standard         Precautions:         Subjective: Pt states that he feels much better and minimal pain in low back. Pain level: 0/10 LBP  Objective: see flow sheet below for treatment       Assessment: Pt demonstrates good return of exercise without VCs needed to correct form. Noting improvement tolerance to exercise. Goals:   1. Pt will demonstrate good understanding of proper posture and body mechanics to decrease pain and improve spinal safety. -MET  2. Pt will improve lumbar spine AROM flexion to [facility] with minimal pain and pulling to allow increase ease with bending forward to don shoes. 3.   Pt will report improved symptom centralization and absence of radicular symptoms for 3 consecutive days to improve function with ADL. -MET  4. Pt will have decreased paraspinal mm tension to tolerate standing >10 minutes for work and home activities   5. Pt will be able to sleep through the night without wakening due to LBP.    6.  Pt will be independent and compliant with comprehensive HEP to maintain progress achieved in PT- MET    Plan: continue with POC to improve mobility  Date: 10/4/22  TX#: 8/10        There ex:  nustep L6 x 10 min  Shuttle 5B 15x2 DL  Shuttle 3B 15x R/L  Standing hip abd/ext yellow 15xea R/L  Heel raises 15x  Supine manual hamstring stretch R/L        Manual therapy:  Prone lumbar paraspinal oscillations, MFR                         HEP: 9/8/22- LTR, SKTC, supine sciatic nerve glide           9/29/22- yellow TB hip abd/ext     Charges: TEx2, MTx1       Total Timed Treatment: 40 min  Total Treatment Time: 40 min

## 2022-10-06 ENCOUNTER — APPOINTMENT (OUTPATIENT)
Dept: PHYSICAL THERAPY | Age: 82
End: 2022-10-06
Attending: FAMILY MEDICINE
Payer: MEDICAID

## 2022-10-06 ENCOUNTER — HOSPITAL ENCOUNTER (OUTPATIENT)
Dept: ULTRASOUND IMAGING | Facility: HOSPITAL | Age: 82
Discharge: HOME | End: 2022-10-06
Attending: FAMILY MEDICINE

## 2022-10-06 DIAGNOSIS — Z13.9 ENCOUNTER FOR SCREENING: ICD-10-CM

## 2022-10-11 ENCOUNTER — OFFICE VISIT (OUTPATIENT)
Dept: PHYSICAL THERAPY | Age: 82
End: 2022-10-11
Attending: FAMILY MEDICINE
Payer: MEDICAID

## 2022-10-11 PROCEDURE — 97140 MANUAL THERAPY 1/> REGIONS: CPT

## 2022-10-11 PROCEDURE — 97110 THERAPEUTIC EXERCISES: CPT

## 2022-10-11 NOTE — PROGRESS NOTES
Dx:  Primary osteoarthritis involving multiple joints (M15.9)       Insurance (Authorized # of Visits):  [phone] [address] Luna [employer] exp 11/5   POC visits: 8   Authorizing Physician: Dr. Isai Muñoz MD visit:  Fall Risk: standard         Precautions:         Subjective: Pt states that he had LBP that woke him up 2 times this past week. Pain level: 0/10 LBP  Objective: see flow sheet below for treatment       Assessment: Reinforced compliance with HEP as pt reports doing them sometimes. Continued with strengthening. Goals:   1. Pt will demonstrate good understanding of proper posture and body mechanics to decrease pain and improve spinal safety. -MET  2. Pt will improve lumbar spine AROM flexion to [facility] with minimal pain and pulling to allow increase ease with bending forward to don shoes. 3.   Pt will report improved symptom centralization and absence of radicular symptoms for 3 consecutive days to improve function with ADL. -MET  4. Pt will have decreased paraspinal mm tension to tolerate standing >10 minutes for work and home activities   5. Pt will be able to sleep through the night without wakening due to LBP.    6.  Pt will be independent and compliant with comprehensive HEP to maintain progress achieved in PT- MET    Plan: continue with POC to improve mobility  Date: 10/4/22  TX#: 8/10 Date: 10/11/22  Tx: 9/10       There ex:  nustep L6 x 10 min  Shuttle 5B 15x2 DL  Shuttle 3B 15x R/L  Standing hip abd/ext yellow 15xea R/L  Heel raises 15x  Supine manual hamstring stretch R/L There ex:  nustep L6 x 10 min   Shuttle 5B 15x2 DL  Shuttle 3B 15x R/L  6\"step w/hip drive 43J R/L  Mini squats 10x (+knee pain)  Standing hip abd/ext yellow [id number] R/L       Manual therapy:  Prone lumbar paraspinal oscillations, MFR  Manual therapy:  Prone lumbar paraspinal oscillations, MFR                       HEP: 9/8/22- LTR, SKTC, supine sciatic nerve glide           9/29/22- yellow TB hip abd/ext     Charges: TEx2, MTx1       Total Timed Treatment: 40 min  Total Treatment Time: 40 min

## 2022-10-13 ENCOUNTER — OFFICE VISIT (OUTPATIENT)
Dept: PHYSICAL THERAPY | Age: 82
End: 2022-10-13
Attending: FAMILY MEDICINE
Payer: MEDICAID

## 2022-10-13 PROCEDURE — 97110 THERAPEUTIC EXERCISES: CPT

## 2022-10-13 PROCEDURE — 97140 MANUAL THERAPY 1/> REGIONS: CPT

## 2022-10-13 NOTE — PROGRESS NOTES
Tevin  Pt has attended 10 visits in Physical Therapy. Dx:  Primary osteoarthritis involving multiple joints (M15.9)       Insurance (Authorized # of Visits):  [phone] [place] exp 11/5   POC visits: 8   Authorizing Physician: Dr. Estephanie Muñoz MD visit:  Fall Risk: standard         Precautions:         Subjective: Pt states that he states that he has good days and bad days but overall LBP is better than it used to be. Per pt's daughter, she reports that pt does not c/o of numbness in leg anymore. Pain level: 0/10 LBP  Objective: see flow sheet below for treatment   ROM:   Trunk         Pain (+/-)   Flexion  75%                +pulling hamstrings    Extension 75% +LBP mild   R Sidebend 75% -   L Sidebend 75% -   R Rotation 75% -   L Rotation 75% -     STRENGTH:   -/5  (* denotes performed with pain)   Left  Right   Hip Flexion (L2) 4  4   Knee Extension (L3) 4 4   Knee Flexion 5 5   Ankle DF (L4) 4+ 4+   EHL (L5) 4+ 4+   Ankle PF (S1) 4+ 4+   Hip Abduction 4- 4   Hip Extension 3+ 3+   Flexibility:      R L   Hamstrings severely restricted severely restricted   Piriformis moderately restricted moderately restricted   Hip Flexor NT NT   TFL NT NT   Quads severely restricted severely restricted   Gastrocs NT NT     5 times sit to stand test: 18 sec w/use of hands    Assessment: Pt has attended 10 PT sessions for LBP. Pt presents with improved lumbar ROM, improved LE strength, improved 5 times sit to stand test.  Pt has been educated and provided with HEP. Pt has met 5/6 LTGs. Discharge pt to [facility] at this time. Goals:   1. Pt will demonstrate good understanding of proper posture and body mechanics to decrease pain and improve spinal safety. -MET  2. Pt will improve lumbar spine AROM flexion to [facility] with minimal pain and pulling to allow increase ease with bending forward to don shoes.- NOT MET  3.    Pt will report improved symptom centralization and absence of radicular symptoms for 3 consecutive days to improve function with ADL. -MET  4. Pt will have decreased paraspinal mm tension to tolerate standing >10 minutes for work and home activities - MET  5. Pt will be able to sleep through the night without wakening due to LBP.- MET  6.   Pt will be independent and compliant with comprehensive HEP to maintain progress achieved in PT- MET    Plan: discharge pt   Date: 10/4/22  TX#: 8/10 Date: 10/11/22  Tx: 9/10 Date: 10/13/22  Tx: 10/10      There ex:  nustep L6 x 10 min  Shuttle 5B 15x2 DL  Shuttle 3B 15x R/L  Standing hip abd/ext yellow 15xea R/L  Heel raises 15x  Supine manual hamstring stretch R/L There ex:  nustep L6 x 10 min   Shuttle 5B 15x2 DL  Shuttle 3B 15x R/L  6\"step w/hip drive 47O R/L  Mini squats 10x (+knee pain)  Standing hip abd/ext yellow [id number] R/L There ex:  nustep L5 x 10 min   Re-assessment  sidelying hip abd 15x R/L      Manual therapy:  Prone lumbar paraspinal oscillations, MFR  Manual therapy:  Prone lumbar paraspinal oscillations, MFR Manual therapy:  Prone lumbar paraspinal MFR                      HEP: 9/8/22- LTR, SKTC, supine sciatic nerve glide           9/29/22- yellow TB hip abd/ext     Charges: TEx2, MTx1       Total Timed Treatment: 40 min  Total Treatment Time: 40 min

## 2022-11-03 ENCOUNTER — OFFICE VISIT (OUTPATIENT)
Dept: FAMILY MEDICINE CLINIC | Facility: CLINIC | Age: 82
End: 2022-11-03
Payer: MEDICAID

## 2022-11-03 VITALS
HEART RATE: 90 BPM | WEIGHT: 149.63 LBS | BODY MASS INDEX: 24.93 KG/M2 | HEIGHT: 65 IN | DIASTOLIC BLOOD PRESSURE: 69 MMHG | SYSTOLIC BLOOD PRESSURE: 104 MMHG

## 2022-11-03 DIAGNOSIS — N18.31 CHRONIC RENAL IMPAIRMENT, STAGE 3A (HCC): ICD-10-CM

## 2022-11-03 DIAGNOSIS — I25.10 CORONARY ARTERY DISEASE INVOLVING NATIVE HEART WITHOUT ANGINA PECTORIS, UNSPECIFIED VESSEL OR LESION TYPE: ICD-10-CM

## 2022-11-03 DIAGNOSIS — H91.93 BILATERAL HEARING LOSS, UNSPECIFIED HEARING LOSS TYPE: ICD-10-CM

## 2022-11-03 DIAGNOSIS — I10 PRIMARY HYPERTENSION: Primary | ICD-10-CM

## 2022-11-03 PROCEDURE — 3008F BODY MASS INDEX DOCD: CPT | Performed by: FAMILY MEDICINE

## 2022-11-03 PROCEDURE — 3074F SYST BP LT 130 MM HG: CPT | Performed by: FAMILY MEDICINE

## 2022-11-03 PROCEDURE — 3078F DIAST BP <80 MM HG: CPT | Performed by: FAMILY MEDICINE

## 2022-11-03 PROCEDURE — 99213 OFFICE O/P EST LOW 20 MIN: CPT | Performed by: FAMILY MEDICINE

## 2022-11-11 ENCOUNTER — OFFICE VISIT (OUTPATIENT)
Dept: AUDIOLOGY | Facility: CLINIC | Age: 82
End: 2022-11-11
Payer: MEDICAID

## 2022-11-11 ENCOUNTER — OFFICE VISIT (OUTPATIENT)
Dept: OTOLARYNGOLOGY | Facility: CLINIC | Age: 82
End: 2022-11-11
Payer: MEDICAID

## 2022-11-11 VITALS — TEMPERATURE: 98 F | HEIGHT: 65 IN | WEIGHT: 149.63 LBS | BODY MASS INDEX: 24.93 KG/M2

## 2022-11-11 DIAGNOSIS — H90.3 SENSORINEURAL HEARING LOSS, BILATERAL: Primary | ICD-10-CM

## 2022-11-11 DIAGNOSIS — H61.23 BILATERAL IMPACTED CERUMEN: ICD-10-CM

## 2022-11-11 DIAGNOSIS — H91.90 HEARING LOSS, UNSPECIFIED HEARING LOSS TYPE, UNSPECIFIED LATERALITY: Primary | ICD-10-CM

## 2022-11-11 PROCEDURE — 92567 TYMPANOMETRY: CPT | Performed by: AUDIOLOGIST

## 2022-11-11 PROCEDURE — 92557 COMPREHENSIVE HEARING TEST: CPT | Performed by: AUDIOLOGIST

## 2022-11-22 ENCOUNTER — OFFICE VISIT (OUTPATIENT)
Dept: FAMILY MEDICINE CLINIC | Facility: CLINIC | Age: 82
End: 2022-11-22
Payer: MEDICAID

## 2022-11-22 VITALS
WEIGHT: 151 LBS | SYSTOLIC BLOOD PRESSURE: 133 MMHG | BODY MASS INDEX: 25.16 KG/M2 | DIASTOLIC BLOOD PRESSURE: 81 MMHG | HEART RATE: 71 BPM | HEIGHT: 65 IN

## 2022-11-22 DIAGNOSIS — R51.9 NONINTRACTABLE EPISODIC HEADACHE, UNSPECIFIED HEADACHE TYPE: ICD-10-CM

## 2022-11-22 DIAGNOSIS — N18.31 CHRONIC RENAL IMPAIRMENT, STAGE 3A (HCC): ICD-10-CM

## 2022-11-22 DIAGNOSIS — I10 PRIMARY HYPERTENSION: Primary | ICD-10-CM

## 2022-11-22 PROCEDURE — 3075F SYST BP GE 130 - 139MM HG: CPT | Performed by: FAMILY MEDICINE

## 2022-11-22 PROCEDURE — 3079F DIAST BP 80-89 MM HG: CPT | Performed by: FAMILY MEDICINE

## 2022-11-22 PROCEDURE — 3008F BODY MASS INDEX DOCD: CPT | Performed by: FAMILY MEDICINE

## 2022-11-22 PROCEDURE — 99214 OFFICE O/P EST MOD 30 MIN: CPT | Performed by: FAMILY MEDICINE

## 2022-11-22 RX ORDER — ASPIRIN 81 MG/1
81 TABLET ORAL DAILY
Qty: 90 TABLET | Refills: 3 | Status: SHIPPED | OUTPATIENT
Start: 2022-11-22

## 2022-11-22 RX ORDER — BUTALBITAL, ACETAMINOPHEN AND CAFFEINE 300; 40; 50 MG/1; MG/1; MG/1
1 CAPSULE ORAL EVERY 6 HOURS PRN
Qty: 40 CAPSULE | Refills: 0 | Status: SHIPPED | OUTPATIENT
Start: 2022-11-22 | End: 2022-12-02

## 2022-11-22 RX ORDER — CHLORAL HYDRATE 500 MG
CAPSULE ORAL
COMMUNITY
Start: 2022-11-09

## 2022-12-12 ENCOUNTER — OFFICE VISIT (OUTPATIENT)
Dept: PULMONOLOGY | Facility: CLINIC | Age: 82
End: 2022-12-12
Payer: MEDICAID

## 2022-12-12 ENCOUNTER — LAB ENCOUNTER (OUTPATIENT)
Dept: LAB | Age: 82
End: 2022-12-12
Attending: FAMILY MEDICINE
Payer: MEDICAID

## 2022-12-12 VITALS
RESPIRATION RATE: 20 BRPM | BODY MASS INDEX: 24.91 KG/M2 | HEART RATE: 72 BPM | SYSTOLIC BLOOD PRESSURE: 126 MMHG | OXYGEN SATURATION: 96 % | HEIGHT: 66 IN | WEIGHT: 155 LBS | DIASTOLIC BLOOD PRESSURE: 75 MMHG

## 2022-12-12 DIAGNOSIS — N18.31 CHRONIC RENAL IMPAIRMENT, STAGE 3A (HCC): ICD-10-CM

## 2022-12-12 DIAGNOSIS — N18.30 STAGE 3 CHRONIC KIDNEY DISEASE, UNSPECIFIED WHETHER STAGE 3A OR 3B CKD (HCC): ICD-10-CM

## 2022-12-12 DIAGNOSIS — R91.1 PULMONARY NODULE: Primary | ICD-10-CM

## 2022-12-12 LAB
ALBUMIN SERPL-MCNC: 3.5 G/DL (ref 3.4–5)
ANION GAP SERPL CALC-SCNC: 6 MMOL/L (ref 0–18)
BUN BLD-MCNC: 23 MG/DL (ref 7–18)
BUN/CREAT SERPL: 18.4 (ref 10–20)
CALCIUM BLD-MCNC: 9.3 MG/DL (ref 8.5–10.1)
CHLORIDE SERPL-SCNC: 109 MMOL/L (ref 98–112)
CO2 SERPL-SCNC: 24 MMOL/L (ref 21–32)
CREAT BLD-MCNC: 1.25 MG/DL
GFR SERPLBLD BASED ON 1.73 SQ M-ARVRAT: 57 ML/MIN/1.73M2 (ref 60–?)
GLUCOSE BLD-MCNC: 89 MG/DL (ref 70–99)
OSMOLALITY SERPL CALC.SUM OF ELEC: 291 MOSM/KG (ref 275–295)
PHOSPHATE SERPL-MCNC: 4 MG/DL (ref 2.5–4.9)
POTASSIUM SERPL-SCNC: 4.4 MMOL/L (ref 3.5–5.1)
SODIUM SERPL-SCNC: 139 MMOL/L (ref 136–145)

## 2022-12-12 PROCEDURE — 3008F BODY MASS INDEX DOCD: CPT | Performed by: INTERNAL MEDICINE

## 2022-12-12 PROCEDURE — 99243 OFF/OP CNSLTJ NEW/EST LOW 30: CPT | Performed by: INTERNAL MEDICINE

## 2022-12-12 PROCEDURE — 3078F DIAST BP <80 MM HG: CPT | Performed by: INTERNAL MEDICINE

## 2022-12-12 PROCEDURE — 80069 RENAL FUNCTION PANEL: CPT

## 2022-12-12 PROCEDURE — 36415 COLL VENOUS BLD VENIPUNCTURE: CPT

## 2022-12-12 PROCEDURE — 3074F SYST BP LT 130 MM HG: CPT | Performed by: INTERNAL MEDICINE

## 2022-12-12 NOTE — PROGRESS NOTES
Dear Marc Burkett  : HISTORY OF PRESENT ILLNESS:  As you know, The patient underwent coronary CT scanning which serendipitously disclosed a 6 mm lingular pulmonary nodule. There is no significant hemoptysis, TB exposure, personal nor family history of deep venous thrombosis, chest pain. 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 He is not aware of ever having had tuberculosis previously. The patient had smoked 1/2 pack/day for 10 years having quit 15 years ago. History is obtained through interpretation line. PAST MEDICAL AND SURGICAL HISTORY:   1. Hyperlipidemia hypertension BPH renal stone intracranial bleed glaucoma    SOCIAL HISTORY: , 9 kids, quit tobacco 15 years ago after 1 pack/day for 10 years, no alcohol, retired farmer    FAMILY HISTORY: Mother  with blindness and anesthesia, father  with heart and kidney disease    ALLERGIES TO MEDICATIONS: None    MEDICATIONS: Aspirin Avodart finasteride eyedrops metoprolol omega-3 rosuvastatin sertraline    REVIEW OF SYSTEMS: Review of Systems:  Vision normal. Ear nose and throat normal. Bowel normal. Bladder function normal. No depression. No thyroid disease. No lymphatic system concerns. No rash. Muscles and joints unremarkable. No weight loss no weight gain. PHYSICAL EXAMINATION: Physical Examination:  Vital signs normal. HEENT examination is unremarkable with pupils equal round and reactive to light and accommodation. Neck without adenopathy, thyromegaly, JVD nor bruit. Lungs clear to auscultation and percussion. Cardiac regular rate and rhythm no murmur. Abdomen nontender, without hepatosplenomegaly and no mass appreciable. Extremities and Musculoskeletal without clubbing cyanosis nor edema, and mobility acceptable. Neurologic grossly intact with symmetric tone and strength and reflex.     LABORATORY: Coronary scanning over read- new 6 mm lingular noncalcified pulmonary nodule adjacent to the left heart border    ASSESSMENT AND PLAN:  PROBLEM 1.  Pulmonary nodule-had developed newly over the last few years. Differential diagnosis includes granuloma versus incipient neoplasm. He quit smoking over 50 years ago and he has only a 10-pack-year history. He worked as a farmer and certainly could have inhaled dust or fungal products from the soil. RECOMMENDATIONS:  1. Repeat CT scan of the chest at the 6-month interval  2. Patient to see me in follow-up thereafter  3. Annual flu shot, COVID booster and contact me promptly if there are any new respiratory problems    I am delighted to assist in Mckay's care.             With warmest regards,     Thanh Dalton MD  Medical Director, [phone], [address] [facility]  Medical Director, [address] [place] [address]. [phone]

## 2022-12-16 ENCOUNTER — OFFICE VISIT (OUTPATIENT)
Dept: NEPHROLOGY | Facility: CLINIC | Age: 82
End: 2022-12-16
Payer: MEDICAID

## 2022-12-16 VITALS
DIASTOLIC BLOOD PRESSURE: 69 MMHG | BODY MASS INDEX: 25 KG/M2 | HEART RATE: 73 BPM | SYSTOLIC BLOOD PRESSURE: 128 MMHG | WEIGHT: 154 LBS

## 2022-12-16 DIAGNOSIS — N18.30 STAGE 3 CHRONIC KIDNEY DISEASE, UNSPECIFIED WHETHER STAGE 3A OR 3B CKD (HCC): Primary | ICD-10-CM

## 2022-12-16 DIAGNOSIS — E87.1 HYPONATREMIA: ICD-10-CM

## 2022-12-16 PROCEDURE — 3078F DIAST BP <80 MM HG: CPT | Performed by: INTERNAL MEDICINE

## 2022-12-16 PROCEDURE — 99214 OFFICE O/P EST MOD 30 MIN: CPT | Performed by: INTERNAL MEDICINE

## 2022-12-16 PROCEDURE — 3074F SYST BP LT 130 MM HG: CPT | Performed by: INTERNAL MEDICINE

## 2022-12-16 RX ORDER — BUTALBITAL, ACETAMINOPHEN AND CAFFEINE 50; 325; 40 MG/1; MG/1; MG/1
1 TABLET ORAL EVERY 6 HOURS PRN
COMMUNITY

## 2022-12-21 RX ORDER — TAMSULOSIN HYDROCHLORIDE 0.4 MG/1
0.8 CAPSULE ORAL DAILY
Qty: 180 CAPSULE | Refills: 1 | Status: SHIPPED | OUTPATIENT
Start: 2022-12-21

## 2022-12-21 NOTE — TELEPHONE ENCOUNTER
Refill passed per [address] protocol.       Requested Prescriptions   Pending Prescriptions Disp Refills    TAMSULOSIN 0.4 MG Oral Cap [Pharmacy Med Name: TAMSULOSIN 0.4MG CAPSULES] 180 capsule 1     Sig: TAKE 2 CAPSULES(0.8 MG) BY MOUTH DAILY       Genitourinary Medications Passed - 12/20/2022 12:51 PM        Passed - Patient does not have pulmonary hypertension on problem list        Passed - In person appointment or virtual visit in the past 12 mos or appointment in next 3 mos     Recent Outpatient Visits              5 days ago Stage 3 chronic kidney disease, unspecified whether stage 3a or 3b CKD [facility])    [address], [address] [place] [address], Elizabeth Chowdary MD    Office Visit    1 week ago Pulmonary nodule    [facility] [address] [place] [address], Zak Ortiz MD    Office Visit    4 weeks ago Primary hypertension    [address], [place], Frances Mcardle, MD    Office Visit    1 month ago Sensorineural hearing loss, bilateral    [facility] Gordon Luna    Office Visit    1 month ago Hearing loss, unspecified hearing loss type, unspecified laterality    [facility], [address], Cristiano Mcmullen MD    Office Visit          Future Appointments         Provider Department Appt Notes    In 3 months Zak White MD [place], [address] [place] [address] 3 Month Follow Up    In 5 months Elizabeth Dominguez MD [address] [place] [address] [facility] [place], [address] [place] [address] 6 months    In 10 months Kaylin rGimm MD [facility], [address], [place] annual ears clean                     Future Appointments         Provider Department Appt Notes    In 3 months Zak White MD [place], [address] [place] [address] 3 Month Follow Up    In 5 months Elizabeth Dominguez MD [address] [place] [address] [facility] [place], [address] [place] [address], Luis 6 months    In 10 months Mago Hoffman MD [facility], [address], [place] annual ears clean            Recent Outpatient Visits              5 days ago Stage 3 chronic kidney disease, unspecified whether stage 3a or 3b CKD [facility])    [address] [place], [address] [place] [address], Elmer Chowdary MD    Office Visit    1 week ago Pulmonary nodule    [facility], [address] [place] [address], Sherman Ortiz MD    Office Visit    4 weeks ago Primary hypertension    [address], [address], Ruben Apurva Lancaster MD    Office Visit    1 month ago Sensorineural hearing loss, bilateral    [facility] Gordon Busby    Office Visit    1 month ago Hearing loss, unspecified hearing loss type, unspecified laterality    [facility], [address], Tristan Mcmullen MD    Office Visit

## 2022-12-22 DIAGNOSIS — E78.00 PURE HYPERCHOLESTEROLEMIA: ICD-10-CM

## 2022-12-22 DIAGNOSIS — I10 PRIMARY HYPERTENSION: Primary | ICD-10-CM

## 2022-12-22 RX ORDER — ROSUVASTATIN CALCIUM 20 MG/1
20 TABLET, COATED ORAL DAILY
Qty: 90 TABLET | Refills: 1 | Status: SHIPPED | OUTPATIENT
Start: 2022-12-22

## 2022-12-27 ENCOUNTER — MED REC SCAN ONLY (OUTPATIENT)
Dept: FAMILY MEDICINE CLINIC | Facility: CLINIC | Age: 82
End: 2022-12-27

## 2023-02-03 ENCOUNTER — TELEPHONE (OUTPATIENT)
Dept: PULMONOLOGY | Facility: CLINIC | Age: 83
End: 2023-02-03

## 2023-02-03 NOTE — TELEPHONE ENCOUNTER
Recd notice that pt is due for CT CHEST in March. Sent letter to this effect; manage care pls advise.

## 2023-05-30 ENCOUNTER — HOSPITAL ENCOUNTER (OUTPATIENT)
Dept: CT IMAGING | Age: 83
Discharge: HOME | End: 2023-05-30
Attending: INTERNAL MEDICINE
Payer: MEDICAID

## 2023-05-30 DIAGNOSIS — R91.1 PULMONARY NODULE: ICD-10-CM

## 2023-05-30 PROCEDURE — 71250 CT THORAX DX C-: CPT | Performed by: INTERNAL MEDICINE

## 2023-06-01 ENCOUNTER — OFFICE VISIT (OUTPATIENT)
Dept: FAMILY MEDICINE CLINIC | Facility: CLINIC | Age: 83
End: 2023-06-01

## 2023-06-01 VITALS
WEIGHT: 149 LBS | HEART RATE: 78 BPM | SYSTOLIC BLOOD PRESSURE: 122 MMHG | DIASTOLIC BLOOD PRESSURE: 78 MMHG | HEIGHT: 66 IN | BODY MASS INDEX: 23.95 KG/M2

## 2023-06-01 DIAGNOSIS — H40.9 GLAUCOMA OF BOTH EYES, UNSPECIFIED GLAUCOMA TYPE: ICD-10-CM

## 2023-06-01 DIAGNOSIS — I10 PRIMARY HYPERTENSION: Primary | ICD-10-CM

## 2023-06-01 DIAGNOSIS — M15.9 PRIMARY OSTEOARTHRITIS INVOLVING MULTIPLE JOINTS: ICD-10-CM

## 2023-06-01 DIAGNOSIS — N18.31 CHRONIC RENAL IMPAIRMENT, STAGE 3A (HCC): ICD-10-CM

## 2023-06-01 DIAGNOSIS — R91.1 LUNG NODULE: ICD-10-CM

## 2023-06-01 PROCEDURE — 3078F DIAST BP <80 MM HG: CPT | Performed by: FAMILY MEDICINE

## 2023-06-01 PROCEDURE — 3008F BODY MASS INDEX DOCD: CPT | Performed by: FAMILY MEDICINE

## 2023-06-01 PROCEDURE — 3074F SYST BP LT 130 MM HG: CPT | Performed by: FAMILY MEDICINE

## 2023-06-01 PROCEDURE — 99213 OFFICE O/P EST LOW 20 MIN: CPT | Performed by: FAMILY MEDICINE

## 2023-06-01 RX ORDER — TAMSULOSIN HYDROCHLORIDE 0.4 MG/1
0.8 CAPSULE ORAL DAILY
Qty: 180 CAPSULE | Refills: 1 | Status: SHIPPED | OUTPATIENT
Start: 2023-06-01

## 2023-06-01 RX ORDER — TEA TREE OIL 100 %
OIL (ML) TOPICAL
Qty: 180 CAPSULE | Refills: 3 | Status: SHIPPED | OUTPATIENT
Start: 2023-06-01

## 2023-06-01 RX ORDER — OMEPRAZOLE 20 MG/1
CAPSULE, DELAYED RELEASE ORAL
Qty: 90 CAPSULE | Refills: 2 | Status: SHIPPED | OUTPATIENT
Start: 2023-06-01

## 2023-06-01 RX ORDER — IBUPROFEN 600 MG/1
600 TABLET ORAL EVERY 6 HOURS PRN
Qty: 60 TABLET | Refills: 3 | Status: SHIPPED | OUTPATIENT
Start: 2023-06-01

## 2023-06-05 ENCOUNTER — TELEPHONE (OUTPATIENT)
Dept: PULMONOLOGY | Facility: CLINIC | Age: 83
End: 2023-06-05

## 2023-06-06 ENCOUNTER — OFFICE VISIT (OUTPATIENT)
Dept: PULMONOLOGY | Facility: CLINIC | Age: 83
End: 2023-06-06

## 2023-06-06 VITALS
DIASTOLIC BLOOD PRESSURE: 70 MMHG | OXYGEN SATURATION: 97 % | RESPIRATION RATE: 16 BRPM | SYSTOLIC BLOOD PRESSURE: 121 MMHG | HEART RATE: 71 BPM

## 2023-06-06 DIAGNOSIS — R91.1 PULMONARY NODULE: Primary | ICD-10-CM

## 2023-06-06 PROCEDURE — 99213 OFFICE O/P EST LOW 20 MIN: CPT | Performed by: INTERNAL MEDICINE

## 2023-06-06 PROCEDURE — 3074F SYST BP LT 130 MM HG: CPT | Performed by: INTERNAL MEDICINE

## 2023-06-06 PROCEDURE — 3078F DIAST BP <80 MM HG: CPT | Performed by: INTERNAL MEDICINE

## 2023-06-06 RX ORDER — PREDNISONE 20 MG/1
TABLET ORAL
Qty: 10 TABLET | Refills: 0 | Status: SHIPPED | OUTPATIENT
Start: 2023-06-06

## 2023-06-06 NOTE — PROGRESS NOTES
The patient is an 24-year-old male [place] from prior evaluation comes in for follow-up. He has a 6 mm lingular pulmonary nodule which has not grown on serial CT imaging. He quit smoking over 50 years ago. The nodule is new compared to 2020. Review of Systems:  Vision normal. Ear nose and throat normal. Bowel normal. Bladder function normal. No depression. No thyroid disease. No lymphatic system concerns. No rash. Muscles and joints unremarkable. No weight loss no weight gain. Physical Examination:  Vital signs normal. HEENT examination is unremarkable with pupils equal round and reactive to light and accommodation. Neck without adenopathy, thyromegaly, JVD nor bruit. Lungs clear to auscultation and percussion. Cardiac regular rate and rhythm no murmur. Abdomen nontender, without hepatosplenomegaly and no mass appreciable. Extremities and Musculoskeletal without clubbing cyanosis nor edema, and mobility acceptable. Neurologic grossly intact with symmetric tone and strength and reflex. Assessment and plan:  1.  6 mm lingular pulmonary nodule-stable on most recent 2 CAT scans; however, but was new compared to 2020. We will repeat the CT scan of the chest in a year and patient to see me thereafter. 2.  Headache-the patient has an arthritic neck and certainly could have occipital headache associated with this. He has taken ibuprofen without benefit. We will try short course of prednisone. F/U primary care.

## 2023-06-22 ENCOUNTER — LAB ENCOUNTER (OUTPATIENT)
Dept: LAB | Age: 83
End: 2023-06-22
Attending: FAMILY MEDICINE
Payer: MEDICAID

## 2023-06-22 DIAGNOSIS — N18.31 CHRONIC RENAL IMPAIRMENT, STAGE 3A (HCC): ICD-10-CM

## 2023-06-22 DIAGNOSIS — I10 PRIMARY HYPERTENSION: ICD-10-CM

## 2023-06-22 LAB
ANION GAP SERPL CALC-SCNC: 3 MMOL/L (ref 0–18)
BUN BLD-MCNC: 29 MG/DL (ref 7–18)
CALCIUM BLD-MCNC: 8.6 MG/DL (ref 8.5–10.1)
CHLORIDE SERPL-SCNC: 110 MMOL/L (ref 98–112)
CO2 SERPL-SCNC: 24 MMOL/L (ref 21–32)
CREAT BLD-MCNC: 1.17 MG/DL
FASTING STATUS PATIENT QL REPORTED: NO
GFR SERPLBLD BASED ON 1.73 SQ M-ARVRAT: 61 ML/MIN/1.73M2 (ref 60–?)
GLUCOSE BLD-MCNC: 115 MG/DL (ref 70–99)
OSMOLALITY SERPL CALC.SUM OF ELEC: 291 MOSM/KG (ref 275–295)
POTASSIUM SERPL-SCNC: 3.9 MMOL/L (ref 3.5–5.1)
SODIUM SERPL-SCNC: 137 MMOL/L (ref 136–145)

## 2023-06-22 PROCEDURE — 80048 BASIC METABOLIC PNL TOTAL CA: CPT

## 2023-06-22 PROCEDURE — 36415 COLL VENOUS BLD VENIPUNCTURE: CPT

## 2023-07-11 DIAGNOSIS — R35.1 BENIGN PROSTATIC HYPERPLASIA WITH NOCTURIA: Primary | ICD-10-CM

## 2023-07-11 DIAGNOSIS — N40.1 BENIGN PROSTATIC HYPERPLASIA WITH NOCTURIA: Primary | ICD-10-CM

## 2023-07-11 RX ORDER — FINASTERIDE 5 MG/1
5 TABLET, FILM COATED ORAL DAILY
Qty: 90 TABLET | Refills: 3 | Status: SHIPPED | OUTPATIENT
Start: 2023-07-11

## 2023-07-11 NOTE — TELEPHONE ENCOUNTER
Refill passed per [facility], [facility] protocol    Requested Prescriptions   Pending Prescriptions Disp Refills    FINASTERIDE 5 MG Oral Tab [Pharmacy Med Name: FINASTERIDE 5MG TABLETS] 90 tablet 3     Sig: TAKE 1 TABLET(5 MG) BY MOUTH DAILY       Genitourinary Medications Passed - 7/11/2023  5:51 AM        Passed - Patient does not have pulmonary hypertension on problem list        Passed - In person appointment or virtual visit in the past 12 mos or appointment in next 3 mos     Recent Outpatient Visits              1 month ago Pulmonary nodule    [facility], [address] [place] [address], Janay CARIAS, Mark Keller MD    Office Visit    1 month ago Primary hypertension    [facility], [address], Ruben Sanchez MD    Office Visit    6 months ago Stage 3 chronic kidney disease, unspecified whether stage 3a or 3b CKD [facility])    [facility], [address] [place] [address], Aracely Chowdary MD    Office Visit    7 months ago Pulmonary nodule    [facility], [address] [place] [address], Janay CARIAS, Mark Keller MD    Office Visit    7 months ago Primary hypertension    [facility] [place], [address], Edith Chappell MD    Office Visit          Future Appointments         Provider Department Appt Notes    In 3 weeks MD Lia Gregg, [address] [place] [address] (Policy Informed via  [id number])    In 2 months Sandie Sanchez MD [facility] follow up 4 months    In 3 months MD Lia Kim, [address] [facility],[address], [place] annual ears clean    In 11 months MD Lia Toure, [address] [place] [address] 3 Month Follow Up                    Future Appointments         Provider Department Appt Notes    In 3 weeks MD Lia Gregg [address] (Policy Informed via  [id number])    In 2 months MD Tere Thurman [place] follow up 4 months    In 3 months Spring Goncalves MD [address], [address], [place] annual ears clean    In 11 months Smitha Alston MD [address], [facility] 3 Month Follow Up            Recent Outpatient Visits              1 month ago Pulmonary nodule    Benito Hall MD    Office Visit    1 month ago Primary hypertension    [address], [address], Gauri Samano MD    Office Visit    6 months ago Stage 3 chronic kidney disease, unspecified whether stage 3a or 3b CKD [facility])    [facility], Wyatt Novak MD    Office Visit    7 months ago Pulmonary nodule    Benito Hall MD    Office Visit    7 months ago Primary hypertension    [address], [address], Gauri Samano MD    Office Visit

## 2023-07-15 ENCOUNTER — LAB ENCOUNTER (OUTPATIENT)
Dept: LAB | Age: 83
End: 2023-07-15
Attending: INTERNAL MEDICINE
Payer: MEDICAID

## 2023-07-15 DIAGNOSIS — N18.30 STAGE 3 CHRONIC KIDNEY DISEASE, UNSPECIFIED WHETHER STAGE 3A OR 3B CKD (HCC): ICD-10-CM

## 2023-07-15 LAB
ANION GAP SERPL CALC-SCNC: 6 MMOL/L (ref 0–18)
BUN BLD-MCNC: 26 MG/DL (ref 7–18)
BUN/CREAT SERPL: 19.1 (ref 10–20)
CALCIUM BLD-MCNC: 8.8 MG/DL (ref 8.5–10.1)
CHLORIDE SERPL-SCNC: 107 MMOL/L (ref 98–112)
CO2 SERPL-SCNC: 24 MMOL/L (ref 21–32)
CREAT BLD-MCNC: 1.36 MG/DL
FASTING STATUS PATIENT QL REPORTED: NO
GFR SERPLBLD BASED ON 1.73 SQ M-ARVRAT: 51 ML/MIN/1.73M2 (ref 60–?)
GLUCOSE BLD-MCNC: 100 MG/DL (ref 70–99)
OSMOLALITY SERPL CALC.SUM OF ELEC: 289 MOSM/KG (ref 275–295)
POTASSIUM SERPL-SCNC: 4.2 MMOL/L (ref 3.5–5.1)
SODIUM SERPL-SCNC: 137 MMOL/L (ref 136–145)

## 2023-07-15 PROCEDURE — 36415 COLL VENOUS BLD VENIPUNCTURE: CPT

## 2023-07-15 PROCEDURE — 80048 BASIC METABOLIC PNL TOTAL CA: CPT

## 2023-07-18 ENCOUNTER — OFFICE VISIT (OUTPATIENT)
Dept: NEPHROLOGY | Facility: CLINIC | Age: 83
End: 2023-07-18

## 2023-07-18 VITALS
HEIGHT: 66 IN | WEIGHT: 160 LBS | HEART RATE: 73 BPM | DIASTOLIC BLOOD PRESSURE: 61 MMHG | SYSTOLIC BLOOD PRESSURE: 109 MMHG | BODY MASS INDEX: 25.71 KG/M2

## 2023-07-18 DIAGNOSIS — N18.30 STAGE 3 CHRONIC KIDNEY DISEASE, UNSPECIFIED WHETHER STAGE 3A OR 3B CKD (HCC): Primary | ICD-10-CM

## 2023-07-18 PROCEDURE — 99215 OFFICE O/P EST HI 40 MIN: CPT | Performed by: INTERNAL MEDICINE

## 2023-07-18 PROCEDURE — 3008F BODY MASS INDEX DOCD: CPT | Performed by: INTERNAL MEDICINE

## 2023-07-18 PROCEDURE — 3078F DIAST BP <80 MM HG: CPT | Performed by: INTERNAL MEDICINE

## 2023-07-18 PROCEDURE — 3074F SYST BP LT 130 MM HG: CPT | Performed by: INTERNAL MEDICINE

## 2023-07-18 NOTE — PROGRESS NOTES
Progress Note     Patient is a 79year old male with pmh of anxiety, [place], CKD stage III, HTN presented after hospital follow up     Patient was admitted to hospital in August for NV and was found to have Na 121 and sodium improved to 128 on discharge. Was placed on fluid restriction of 1800 ml. Kidney function were stable at 1.4 mg/dl - was on toradol injections. Translation thru language line and accompanied by daughter. State was drinking 4-5 bottles of water and was getting Toradol injection in [place]. No leg swelling     BP stable currently. Takes ibuprofen 600 mg from [place] for headaches as needed     C/o bilateral lower back pain - more consistent with MSK. C/o poor urine stream - on flomax. Taking 2 different kinds of NSAIDS as pain not better with tylenol      HISTORY:  Past Medical History:   Diagnosis Date    BPH (benign prostatic hyperplasia)     Essential hypertension     Glaucoma     Hearing loss     Hyperlipidemia     ICB (intracranial bleed) (HCC)     Renal stone       Past Surgical History:   Procedure Laterality Date    CATARACT             Medications (Active prior to today's visit):  Current Outpatient Medications   Medication Sig Dispense Refill    finasteride 5 MG Oral Tab Take 1 tablet (5 mg total) by mouth daily. 90 tablet 3    metoprolol tartrate 25 MG Oral Tab Take 0.5 tablets (12.5 mg total) by mouth 2 (two) times daily. 90 tablet 1    predniSONE 20 MG Oral Tab 2 tabs daily for 3 days then one tab daily until completed 10 tablet 0    ibuprofen 600 MG Oral Tab Take 1 tablet (600 mg total) by mouth every 6 (six) hours as needed for Pain. Always take it with food. 60 tablet 3    Misc Natural Products (GLUCOSAMINE CHONDROITIN VIT D3) Oral Cap Take 2 tab po Q day 180 capsule 3    tamsulosin 0.4 MG Oral Cap Take 2 capsules (0.8 mg total) by mouth daily.  180 capsule 1    omeprazole 20 MG Oral Capsule Delayed Release TOME 1 CAPSULA POR LA BOCA DIARAMENTE EN AYUNAS 90 capsule 2 sertraline 50 MG Oral Tab Take 1 tablet (50 mg total) by mouth daily. 90 tablet 1    rosuvastatin 20 MG Oral Tab Take 1 tablet (20 mg total) by mouth daily. 90 tablet 1    Omega-3 1000 MG Oral Cap Take 1 capsule orally once a day. aspirin 81 MG Oral Tab EC Take 1 tablet (81 mg total) by mouth daily. 90 tablet 3    Omega-3-acid Ethyl Esters 1 g Oral Cap Take 1 capsule (1 g total) by mouth 2 (two) times daily. LATANOPROST 0.005 % Ophthalmic Solution INSTILL 1 DROP IN BOTH EYES EVERY NIGHT 2.5 mL 6       Allergies:  No Known Allergies      ROS:     Constitutional:  Negative for decreased activity, fever, irritability and lethargy  ENMT:  Negative for ear drainage, hearing loss and nasal drainage  Eyes:  Negative for eye discharge and vision loss  Cardiovascular:  Negative for chest pain, sob  Respiratory:  Negative for cough, dyspnea and wheezing  Gastrointestinal:  Negative for abdominal pain, constipation  Genitourinary:  Negative for dysuria and hematuria  Endocrine:  Negative for abnormal sleep patterns  Hema/Lymph:  Negative for easy bleeding and easy bruising  Integumentary:  Negative for pruritus and rash  Musculoskeletal:  Negative for bone/joint symptoms  Neurological:  Negative for gait disturbance       07/18/23  1106   BP: 109/61   Pulse: 73       Wt Readings from Last 6 Encounters:  07/18/23 : 160 lb (72.6 kg)  06/01/23 : 149 lb (67.6 kg)  12/16/22 : 154 lb (69.9 kg)  12/12/22 : 155 lb (70.3 kg)  11/22/22 : 151 lb (68.5 kg)  11/11/22 : 149 lb 9.6 oz (67.9 kg)      PHYSICAL EXAM:   Constitutional: appears well hydrated alert and responsive   Head/Face: normocephalic  Eyes/Vision: normal extraocular motion is intact  Nose/Mouth/Throat:mucous membranes are moist   Neck/Thyroid: neck is supple   Back/Spine: no abnormalities noted  Musculoskeletal: no deformities  Extremities: no edema  Neurological:  Grossly normal       ASSESSMENT/PLAN:     1.  Hyponatremia:   - Na on admit 121 and discharge at 128 - Na now 138 and stable   - baseline Na in normal range  - urine sodium acceptable and urine Osm low - suggestive of high water intake   - fluid restriction to 1800 ml   - uric acid wnl  - appears euvolemic on exam   - cont. vitamin D3 at 2000 units a day      2. CKD stage III:  - CKD presumably 2/2 NSAIDs related injury and HTN and aging   - creatinine 1.3 mg/dl with an eGFR 56 ml/min - stable . Creatinine btw 1.3-1.4 mg/dl  - UA unremarkable  - UPCR 0.3   - avoid nephrotoxins  - was taking toradol injections in [place]- counseled  - CTAP showed symmetric enhancement of kidneys. Punctate left renal calculi are noted. Renal cortical and peripelvic cysts  - was taking toradol and now naproxen - counseled repeatedly not to take it.  Takes 2 different kinds of NSAIDs    - on metoprolol 12.5 mg BID dose     Follow up in 6 months     Lab tests prior to next visit     Orders This Visit:  Orders Placed This Encounter      Basic Metabolic Panel (8)      Microalb/Creat Ratio, Random Urine      Urinalysis with Culture Reflex      Meds This Visit:  Requested Prescriptions      No prescriptions requested or ordered in this encounter       Imaging & Referrals:  None     7/18/2023  Leonora Lucas MD

## 2023-09-27 ENCOUNTER — TELEPHONE (OUTPATIENT)
Dept: FAMILY MEDICINE CLINIC | Facility: CLINIC | Age: 83
End: 2023-09-27

## 2023-09-27 NOTE — TELEPHONE ENCOUNTER
Current Outpatient Medications:     omeprazole 20 MG Oral Capsule Delayed Release, TOME 1 CAPSULA POR LA BOCA DIARAMENTE EN AYUNAS, Disp: 90 capsule, Rfl: 2    Key: [id number]  Patient Last Name: Andrae Arroyo  : 1940

## 2023-11-06 NOTE — TELEPHONE ENCOUNTER
Pharmacy requesting refill      sertraline 50 MG Oral Tab, Take 1 tablet (50 mg total) by mouth daily. , Disp: 90 tablet, Rfl: 1

## 2023-11-07 NOTE — TELEPHONE ENCOUNTER
Patient is MyChart active but Indian speaking, will need an  . CSS=please assists. Passes protocol but with POP UP HIGH WARNING ALERT, sent to PCP for approval.     High  Drug-Drug: ibuprofen and sertralineToxic effects may be increased with concurrent administration of ibuprofen and Selective Serotonin Reuptake Inhibitors. The risk of upper gastrointestinal bleeding may be increased. Patients taking both drugs concurrently should be educated about the signs and symptoms of GI bleeding. OFFICE VISIT 6/1/23; Instructions    Return in about 4 months (around 10/9/2023). Future Appointments   Date Time Provider Uriel Hdez   1/18/2024 10:00 AM Jason Erickson MD [id number] [facility] Kim BALTAZAR   6/6/2024  9:30 AM Noel Mueller MD [facility]         Refill passed per RPost, [id number] protocol. Requested Prescriptions   Pending Prescriptions Disp Refills    sertraline 50 MG Oral Tab 90 tablet 1     Sig: Take 1 tablet (50 mg total) by mouth daily.        Psychiatric Non-Scheduled (Anti-Anxiety) Passed - 11/6/2023  3:49 PM        Passed - In person appointment or virtual visit in the past 6 mos or appointment in next 3 mos     Recent Outpatient Visits              3 months ago Stage 3 chronic kidney disease, unspecified whether stage 3a or 3b CKD [facility])    Epi Lewis Willo Amor, MD    Office Visit    5 months ago Pulmonary nodule    Matteo Francisco MD    Office Visit    5 months ago Primary hypertension    [address] [facility] [address], [address], Ruben Huitron MD    Office Visit    10 months ago Stage 3 chronic kidney disease, unspecified whether stage 3a or 3b CKD [facility])    Epi Lewis Willo Amor, MD    Office Visit    10 months ago Pulmonary nodule    [facility], [address] [facility], Melonie Diallo MD    Office Visit          Future Appointments         Provider Department Appt Notes    In 2 months Priyank Hernandez MD [address], [address] [facility], [place] 6 months    In 7 months Ankur Black MD [address], [address] [facility], [employer] 3 Month Follow Up                            Future Appointments         Provider Department Appt Notes    In 2 months Priyank Hernandez MD [address], [address] [facility], [employer] 6 months    In 7 months Ankur Black MD [address], [address] [facility], [place] 3 Month Follow Up             Recent Outpatient Visits              3 months ago Stage 3 chronic kidney disease, unspecified whether stage 3a or 3b CKD [facility])    Luis Irving MD    Office Visit    5 months ago Pulmonary nodule    Erica Bautista MD    Office Visit    5 months ago Primary hypertension    [address], [address], Reggie Forrest MD    Office Visit    10 months ago Stage 3 chronic kidney disease, unspecified whether stage 3a or 3b CKD [facility])    Luis Irving True Feast, MD    Office Visit    10 months ago Pulmonary nodule    Erica Bautista MD    Office Visit

## 2023-11-29 ENCOUNTER — OFFICE VISIT (OUTPATIENT)
Dept: FAMILY MEDICINE CLINIC | Facility: CLINIC | Age: 83
End: 2023-11-29

## 2023-11-29 VITALS
HEIGHT: 66 IN | HEART RATE: 73 BPM | WEIGHT: 159.19 LBS | DIASTOLIC BLOOD PRESSURE: 80 MMHG | SYSTOLIC BLOOD PRESSURE: 138 MMHG | BODY MASS INDEX: 25.58 KG/M2

## 2023-11-29 DIAGNOSIS — N18.31 CHRONIC RENAL IMPAIRMENT, STAGE 3A (HCC): ICD-10-CM

## 2023-11-29 DIAGNOSIS — I10 PRIMARY HYPERTENSION: Primary | ICD-10-CM

## 2023-11-29 DIAGNOSIS — F41.8 DEPRESSION WITH ANXIETY: ICD-10-CM

## 2023-11-29 DIAGNOSIS — I25.10 CORONARY ARTERY DISEASE INVOLVING NATIVE CORONARY ARTERY OF NATIVE HEART WITHOUT ANGINA PECTORIS: ICD-10-CM

## 2023-11-29 PROCEDURE — 3079F DIAST BP 80-89 MM HG: CPT | Performed by: FAMILY MEDICINE

## 2023-11-29 PROCEDURE — 90472 IMMUNIZATION ADMIN EACH ADD: CPT | Performed by: FAMILY MEDICINE

## 2023-11-29 PROCEDURE — 90471 IMMUNIZATION ADMIN: CPT | Performed by: FAMILY MEDICINE

## 2023-11-29 PROCEDURE — 3008F BODY MASS INDEX DOCD: CPT | Performed by: FAMILY MEDICINE

## 2023-11-29 PROCEDURE — 3075F SYST BP GE 130 - 139MM HG: CPT | Performed by: FAMILY MEDICINE

## 2023-11-29 PROCEDURE — 90662 IIV NO PRSV INCREASED AG IM: CPT | Performed by: FAMILY MEDICINE

## 2023-11-29 PROCEDURE — 90677 PCV20 VACCINE IM: CPT | Performed by: FAMILY MEDICINE

## 2023-11-29 PROCEDURE — 99214 OFFICE O/P EST MOD 30 MIN: CPT | Performed by: FAMILY MEDICINE

## 2023-11-29 RX ORDER — BUTALBITAL AND ACETAMINOPHEN 300; 50 MG/1; MG/1
1 TABLET ORAL EVERY 6 HOURS PRN
Qty: 84 TABLET | Refills: 0 | Status: SHIPPED | OUTPATIENT
Start: 2023-11-29

## 2023-11-29 RX ORDER — ROSUVASTATIN CALCIUM 20 MG/1
20 TABLET, COATED ORAL DAILY
Qty: 90 TABLET | Refills: 1 | Status: SHIPPED | OUTPATIENT
Start: 2023-11-29

## 2023-11-29 RX ORDER — BUTALBITAL AND ACETAMINOPHEN 300; 50 MG/1; MG/1
1 TABLET ORAL EVERY 6 HOURS PRN
COMMUNITY
Start: 2022-12-21 | End: 2023-11-29

## 2023-11-29 RX ORDER — OMEPRAZOLE 20 MG/1
CAPSULE, DELAYED RELEASE ORAL
Qty: 90 CAPSULE | Refills: 2 | Status: SHIPPED | OUTPATIENT
Start: 2023-11-29

## 2023-11-29 RX ORDER — METOPROLOL SUCCINATE 25 MG/1
25 TABLET, EXTENDED RELEASE ORAL DAILY
COMMUNITY
Start: 2023-11-20

## 2023-11-29 RX ORDER — FINASTERIDE 5 MG/1
5 TABLET, FILM COATED ORAL DAILY
Qty: 90 TABLET | Refills: 3 | Status: SHIPPED | OUTPATIENT
Start: 2023-11-29

## 2023-11-29 RX ORDER — TAMSULOSIN HYDROCHLORIDE 0.4 MG/1
0.8 CAPSULE ORAL DAILY
Qty: 180 CAPSULE | Refills: 1 | Status: SHIPPED | OUTPATIENT
Start: 2023-11-29

## 2023-11-29 RX ORDER — TEA TREE OIL 100 %
OIL (ML) TOPICAL
Qty: 180 CAPSULE | Refills: 3 | Status: SHIPPED | OUTPATIENT
Start: 2023-11-29

## 2024-01-10 RX ORDER — ASPIRIN 81 MG/1
81 TABLET ORAL DAILY
Qty: 90 TABLET | Refills: 3 | Status: SHIPPED | OUTPATIENT
Start: 2024-01-10

## 2024-01-10 NOTE — TELEPHONE ENCOUNTER
Refill passed per [facility] protocol.    Requested Prescriptions   Pending Prescriptions Disp Refills    ASPIRIN LOW DOSE 81 MG Oral Tab EC [Pharmacy Med Name: ASPIRIN 81MG EC LOW DOSE TABLETS] 90 tablet 3     Sig: TAKE 1 TABLET BY MOUTH DAILY       Aspirin Protocol Passed - 1/9/2024  5:45 AM        Passed - In person appointment or virtual visit in the past 6 mos or appointment in next 3 mos     Recent Outpatient Visits              [facility]    [facility]    5 months ago Stage 3 chronic kidney disease, unspecified whether stage 3a or 3b CKD (HCC)    [facility] Mariano Sykes MD    Office Visit    7 months ago Pulmonary nodule    [facility] Ricardo White MD    Office Visit    7 months ago Primary hypertension    [facility] [place] Brandan Chow MD    Office Visit    1 year ago Stage 3 chronic kidney disease, unspecified whether stage 3a or 3b CKD (HCC)    [facility] Mariano Sykes MD    Office Visit          Future Appointments         Provider Department Appt Notes    In 1 week Mariano Sykes MD [facility] 6 months    In 2 months Brandan Arthur MD [facility] 4 Month Follow Up    In 4 months Ricardo White MD [facility] 3 Month Follow Up                     Recent Outpatient Visits              1 month ago [facility]    [facility] Visit    5 months ago Stage 3 chronic kidney disease, unspecified whether stage 3a or 3b CKD ([facility])    [facility] Mariano Sykes,  MD    Office Visit    7 months ago Pulmonary nodule    [facility] Ricardo White MD    Office Visit    7 months ago Primary hypertension    [facility] Brandan Arthur MD    Office Visit    1 year ago Stage 3 chronic kidney disease, unspecified whether stage 3a or 3b CKD (HCC)    [facility] [place] Mariano Sykes MD    Office Visit            Future Appointments         Provider Department Appt Notes    In 1 week Mariano Sykes MD [facility] 6 months    In 2 months Brandan Arthur MD [facility] 4 Month Follow Up    In 4 months Ricardo White MD [facility] 3 Month Follow Up

## 2024-09-03 ENCOUNTER — TELEPHONE (OUTPATIENT)
Dept: PULMONOLOGY | Facility: CLINIC | Age: 84
End: 2024-09-03

## 2024-10-07 ENCOUNTER — TELEPHONE (OUTPATIENT)
Dept: NEPHROLOGY | Facility: CLINIC | Age: 84
End: 2024-10-07

## 2024-10-07 ENCOUNTER — TELEPHONE (OUTPATIENT)
Dept: PULMONOLOGY | Facility: CLINIC | Age: 84
End: 2024-10-07

## 2024-10-07 DIAGNOSIS — R91.1 PULMONARY NODULE: Primary | ICD-10-CM

## 2024-10-07 NOTE — TELEPHONE ENCOUNTER
Spoke with patient's daughter Sonja (verbal release on file) and informed of new order for CT Chest. Provided phone number for central scheduling. Daughter verbalized understanding.

## 2024-10-07 NOTE — TELEPHONE ENCOUNTER
Pharmacy requesting refill      tamsulosin 0.4 MG Oral Cap, Take 2 capsules (0.8 mg total) by mouth daily., Disp: 180 capsule, Rfl: 1

## 2024-10-08 RX ORDER — TAMSULOSIN HYDROCHLORIDE 0.4 MG/1
0.8 CAPSULE ORAL DAILY
Qty: 180 CAPSULE | Refills: 3 | Status: SHIPPED | OUTPATIENT
Start: 2024-10-08

## 2024-10-08 NOTE — TELEPHONE ENCOUNTER
Refill Per Protocol     Requested Prescriptions   Pending Prescriptions Disp Refills    tamsulosin 0.4 MG Oral Cap 180 capsule 1     Sig: Take 2 capsules (0.8 mg total) by mouth daily.       Genitourinary Medications Passed - 10/7/2024  9:10 AM        Passed - Patient does not have pulmonary hypertension on problem list        Passed - In person appointment or virtual visit in the past 12 mos or appointment in next 3 mos     Recent Outpatient Visits              10 months ago Primary hypertension    [facility], Brandan Chow MD    Office Visit    1 year ago Stage 3 chronic kidney disease, unspecified whether stage 3a or 3b CKD ([facility])    [facility] Mariano [facility]kes [facility]    Office Visit    1 year ago Pulmonary nodule    [facility] Ricardo White MD    Office Visit    1 year ago Primary hypertension    [facility], Brandan Chow MD    Office Visit    1 year ago Stage 3 chronic kidney disease, unspecified whether stage 3a or 3b CKD (HCC)    [facility] [place] Mariano Sykes MD    Office Visit          Future Appointments         Provider Department Appt Notes    In 3 weeks Brandan Arthur MD [facility] px / no last px on file/ per Sonja daughter says pt have ONLY BCBS comm    In 5 months Mariano Sykes MD [facility] Stage 3 CKD Follow Up    In 5 months Ricardo White MD [facility] Pulmonary Nodule - speaks Tajik                           Future Appointments         Provider Department Appt Notes    In 3 weeks Brandan Arthur MD [facility] px / no last px on file/ per Sonja daughter says pt have ONLY BCBS comm     In 5 months Mariano Sykes MD [facility] Stage 3 CKD Follow Up    In 5 months Ricardo White MD [facility], [facility] [place] Pulmonary Nodule - speaks Sudanese          Recent Outpatient Visits              10 months ago Primary hypertension    [facility], Brandan Chow MD    Office Visit    1 year ago Stage 3 chronic kidney disease, unspecified whether stage 3a or 3b CKD (HCC)    [facility] [place]    Office Visit    1 year ago Pulmonary nodule    [facility], [facility] Ricardo White MD    Office Visit    1 year ago Primary hypertension    [facility], Brandan Chow MD    Office Visit    1 year ago Stage 3 chronic kidney disease, unspecified whether stage 3a or 3b CKD (HCC)    [facility] [place] Mraiano Sykes MD    Office Visit

## 2024-10-08 NOTE — TELEPHONE ENCOUNTER
Current appt: 3/11/25     No lab orders pending, left voice message to call office to schedule sooner appointment with Dr. Aris Henry- next available.    Last office visit: 7/18/23 rtc 6 months- labs prior to next visit  Appt: 1/18/24: pt canceled via portal    Danish message to call office and schedule sooner appointment

## 2024-10-17 NOTE — TELEPHONE ENCOUNTER
Dr Aris Henry  spoke to pt daughter Sonja LARA verified patient is going to [place] in Dec be back in March that is why the appt is not until 3/11/25.Any labs that you want pt to do .thanks.

## 2024-10-29 ENCOUNTER — OFFICE VISIT (OUTPATIENT)
Dept: FAMILY MEDICINE CLINIC | Facility: CLINIC | Age: 84
End: 2024-10-29
Payer: MEDICAID

## 2024-10-29 VITALS
DIASTOLIC BLOOD PRESSURE: 82 MMHG | BODY MASS INDEX: 26.26 KG/M2 | HEART RATE: 85 BPM | SYSTOLIC BLOOD PRESSURE: 127 MMHG | WEIGHT: 163.38 LBS | HEIGHT: 66 IN

## 2024-10-29 DIAGNOSIS — Z86.69 HISTORY OF GLAUCOMA: ICD-10-CM

## 2024-10-29 DIAGNOSIS — E11.3299 TYPE 2 DIABETES MELLITUS WITH MILD NONPROLIFERATIVE RETINOPATHY, WITHOUT LONG-TERM CURRENT USE OF INSULIN, MACULAR EDEMA PRESENCE UNSPECIFIED, UNSPECIFIED LATERALITY (HCC): ICD-10-CM

## 2024-10-29 DIAGNOSIS — Z23 NEED FOR INFLUENZA VACCINATION: ICD-10-CM

## 2024-10-29 DIAGNOSIS — Z00.00 PHYSICAL EXAM: Primary | ICD-10-CM

## 2024-10-29 DIAGNOSIS — F41.8 DEPRESSION WITH ANXIETY: ICD-10-CM

## 2024-10-29 PROCEDURE — 90471 IMMUNIZATION ADMIN: CPT | Performed by: FAMILY MEDICINE

## 2024-10-29 PROCEDURE — 99397 PER PM REEVAL EST PAT 65+ YR: CPT | Performed by: FAMILY MEDICINE

## 2024-10-29 PROCEDURE — 90662 IIV NO PRSV INCREASED AG IM: CPT | Performed by: FAMILY MEDICINE

## 2024-10-29 RX ORDER — TIMOLOL MALEATE 5 MG/ML
1 SOLUTION/ DROPS OPHTHALMIC 2 TIMES DAILY
COMMUNITY
Start: 2024-10-21

## 2024-10-29 RX ORDER — BRIMONIDINE TARTRATE 2 MG/ML
1 SOLUTION/ DROPS OPHTHALMIC 2 TIMES DAILY
COMMUNITY
Start: 2024-10-21

## 2024-10-29 NOTE — PROGRESS NOTES
10/29/2024  4:22 PM    Mckay Huffman is a 84 year old male.    Chief complaint(s):   Chief Complaint   Patient presents with    Routine Physical    Glaucoma     Requesting new referral to specialist      HPI:     Mckay Huffman primary complaint is regarding CPE.       Mckay Huffman is a 84 year old male is here for routine periodic health screening and examination.  His last physical exam was 2-3 years ago.  His last ECG was 1 years ago and was normal. His last diabetes screening test was 1 years ago and was normal.   His last cholesterol test was 1 year ago and was  hyperlipidemia .   He is current with his Td immunization.  He is current with his Flu vaccination and is interested in receiving it today.  Patient last colonoscopy was 2 years ago. He is not a smoker.        HISTORY:  Past Medical History:    BPH (benign prostatic hyperplasia)    Essential hypertension    Glaucoma    Hearing loss    Hyperlipidemia    ICB (intracranial bleed) (HCC)    Renal stone      Past Surgical History:   Procedure Laterality Date    Cataract        Family History   Problem Relation Age of Onset    Heart Disorder Father       Social History:   Social History     Socioeconomic History    Marital status:    Tobacco Use    Smoking status: Never     Passive exposure: Never    Smokeless tobacco: Never   Substance and Sexual Activity    Alcohol use: Not Currently    Drug use: Not Currently     Social Drivers of Health      Received from [facility]        Immunizations:   Immunization History   Administered Date(s) Administered    Covid-19 Vaccine Moderna 50 Mcg/0.25 Ml 12/16/2021    Covid-19 Vaccine Pfizer 30 mcg/0.3 ml 04/01/2021    FLU VAC High Dose 65 YRS & Older PRSV Free (37316) 10/15/2020, 10/03/2022, 11/29/2023    FLUAD High Dose 65 yr and older (47352) 10/26/2021    High Dose Fluzone Influenza Vaccine, 65yr+ PF 0.5mL (70342) 10/29/2024    Pneumococcal Conjugate PCV20 11/29/2023    Pneumovax 23  08/11/2022       Medications (Active prior to today's visit):  Current Outpatient Medications   Medication Sig Dispense Refill    timolol 0.5 % Ophthalmic Solution Place 1 drop into both eyes 2 (two) times daily.      brimonidine 0.2 % Ophthalmic Solution Place 1 drop into both eyes 2 (two) times daily.      tamsulosin 0.4 MG Oral Cap Take 2 capsules (0.8 mg total) by mouth daily. 180 capsule 3    finasteride 5 MG Oral Tab Take 1 tablet (5 mg total) by mouth daily. 90 tablet 3    LATANOPROST 0.005 % Ophthalmic Solution INSTILL 1 DROP IN BOTH EYES EVERY NIGHT 2.5 mL 6    aspirin (ASPIRIN LOW DOSE) 81 MG Oral Tab EC Take 1 tablet (81 mg total) by mouth daily. 90 tablet 3    metoprolol succinate ER 25 MG Oral Tablet 24 Hr Take 1 tablet (25 mg total) by mouth daily.      rosuvastatin 20 MG Oral Tab Take 1 tablet (20 mg total) by mouth daily. 90 tablet 1    sertraline 50 MG Oral Tab Take 1 tablet (50 mg total) by mouth daily. 90 tablet 2    omeprazole 20 MG Oral Capsule Delayed Release TOME 1 CAPSULA POR LA BOCA DIARAMENTE EN AYUNAS 90 capsule 2    metoprolol tartrate 25 MG Oral Tab Take 0.5 tablets (12.5 mg total) by mouth 2 (two) times daily. 90 tablet 1    Butalbital-Acetaminophen  MG Oral Tab Take 1 tablet by mouth every 6 (six) hours as needed. 84 tablet 0    ibuprofen 600 MG Oral Tab Take 1 tablet (600 mg total) by mouth every 6 (six) hours as needed for Pain. Always take it with food. 60 tablet 3    Omega-3 1000 MG Oral Cap Take 1 capsule orally once a day. (Patient not taking: Reported on 10/29/2024)         Allergies:  Allergies[1]      ROS:   Review of Systems   Constitutional:  Negative for appetite change, fatigue and fever.   Eyes:  Positive for visual disturbance.   Respiratory:  Negative for shortness of breath.    Cardiovascular:  Negative for chest pain.   Gastrointestinal:  Negative for abdominal pain.   Endocrine: Positive for polyuria.   Genitourinary:  Negative for dysuria and hematuria.    Musculoskeletal:  Positive for arthralgias. Negative for myalgias.   Skin:  Negative for rash.   Neurological:  Negative for dizziness, weakness and headaches.       PHYSICAL EXAM:   VS: /82 (BP Location: Right arm, Patient Position: Sitting, Cuff Size: adult)   Pulse 85   Ht 5' 6\" (1.676 m)   Wt 163 lb 6.4 oz (74.1 kg)   BMI 26.37 kg/m²     Physical Exam  Vitals reviewed.   Constitutional:       Appearance: Normal appearance. He is well-developed.   HENT:      Head: Normocephalic.      Right Ear: Tympanic membrane normal.      Left Ear: Tympanic membrane normal.      Mouth/Throat:      Pharynx: Oropharynx is clear.   Eyes:      General: No scleral icterus.     Conjunctiva/sclera: Conjunctivae normal.   Cardiovascular:      Rate and Rhythm: Normal rate and regular rhythm.      Heart sounds: Murmur heard.      Systolic murmur is present with a grade of 1/6.   Pulmonary:      Effort: Pulmonary effort is normal.      Breath sounds: Normal breath sounds.   Abdominal:      General: Bowel sounds are normal.      Palpations: Abdomen is soft.      Tenderness: There is no abdominal tenderness.   Musculoskeletal:      Cervical back: Neck supple.      Comments: ++ bilateral knee synovitis   Lymphadenopathy:      Comments: LEs no edema    Skin:     Findings: No rash.   Psychiatric:         Mood and Affect: Mood normal.         LABORATORY RESULTS:       EKG / Spirometry :      Radiology: No results found.     ASSESSMENT/PLAN:   Assessment   Encounter Diagnoses   Name Primary?    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, without long-term current use of insulin, macular edema presence unspecified, unspecified laterality (HCC)          CPE PLAN:    LABS / TEST & ORDERS for today's visit :Blood test(s) ordered today for send out to [facility] lab:  Orders Placed This Encounter    Procedures    CBC With Differential With Platelet    Comp Metabolic Panel (14)    Hemoglobin A1C    Lipid Panel    PSA, Total W Reflex To Free    TSH W Reflex To Free T4    Vitamin D    Urinalysis with Culture Reflex    Influenza, High-Dose (Fluzone) [18619]     Referrals: INFLUENZA VAC HIGH DOSE PRSV FREE  OPHTHALMOLOGY - INTERNAL  [facility] - EXTERNAL  In-House; Urine dip.  Test/Procedures done today include: EKG.    IMMUNIZATIONS: none, given today.    RECOMMENDATIONS given include: ANTICIPATORY GUIDANCE  topics covered today include: safety (i.e. seat belts, helmets, sunscreen, protective sports gear ), nutrition (i.e. healthy meals and snacks (i.e. avoid junk food and high-carbohydrate foods); athletic conditioning, fluids; low fat milk, limit to less than 20 oz. a day; dental care ), and Healthy habits& Social competence & Responsibilities: Recommendations on physical activity; exercise daily or at least 3 times a week for 30-60 minutes doing cardiovascular exercise. Encourage to maintain the best physical and dental hygiene possible.    FOLLOW-UP: Schedule a follow-up visit in 12 months.          Orders This Visit:  Orders Placed This Encounter   Procedures    CBC With Differential With Platelet    Comp Metabolic Panel (14)    Hemoglobin A1C    Lipid Panel    PSA, Total W Reflex To Free    TSH W Reflex To Free T4    Vitamin D    Urinalysis with Culture Reflex    Influenza, High-Dose (Fluzone) [55112]       Meds This Visit:  Requested Prescriptions      No prescriptions requested or ordered in this encounter       Imaging & Referrals:  INFLUENZA VAC HIGH DOSE PRSV FREE  OPHTHALMOLOGY - INTERNAL  [facility] - EXTERNAL         TIFF GALVAN MD         [1] No Known Allergies

## 2024-11-01 ENCOUNTER — LAB ENCOUNTER (OUTPATIENT)
Dept: LAB | Age: 84
End: 2024-11-01
Attending: FAMILY MEDICINE
Payer: MEDICAID

## 2024-11-01 DIAGNOSIS — R79.89 LOW TSH LEVEL: Primary | ICD-10-CM

## 2024-11-01 DIAGNOSIS — Z00.00 PHYSICAL EXAM: ICD-10-CM

## 2024-11-01 LAB
ALBUMIN SERPL-MCNC: 4.1 G/DL (ref 3.2–4.8)
ALBUMIN/GLOB SERPL: 1.2 {RATIO} (ref 1–2)
ALP LIVER SERPL-CCNC: 105 U/L
ALT SERPL-CCNC: 30 U/L
ANION GAP SERPL CALC-SCNC: 6 MMOL/L (ref 0–18)
AST SERPL-CCNC: 25 U/L (ref ?–34)
BASOPHILS # BLD AUTO: 0.04 X10(3) UL (ref 0–0.2)
BASOPHILS NFR BLD AUTO: 0.5 %
BILIRUB SERPL-MCNC: 0.5 MG/DL (ref 0.2–1.1)
BILIRUB UR QL: NEGATIVE
BUN BLD-MCNC: 32 MG/DL (ref 9–23)
BUN/CREAT SERPL: 23.7 (ref 10–20)
CALCIUM BLD-MCNC: 8.9 MG/DL (ref 8.7–10.4)
CHLORIDE SERPL-SCNC: 104 MMOL/L (ref 98–112)
CHOLEST SERPL-MCNC: 149 MG/DL (ref ?–200)
CLARITY UR: CLEAR
CO2 SERPL-SCNC: 28 MMOL/L (ref 21–32)
COLOR UR: COLORLESS
CREAT BLD-MCNC: 1.35 MG/DL
DEPRECATED RDW RBC AUTO: 51.7 FL (ref 35.1–46.3)
EGFRCR SERPLBLD CKD-EPI 2021: 51 ML/MIN/1.73M2 (ref 60–?)
EOSINOPHIL # BLD AUTO: 0.08 X10(3) UL (ref 0–0.7)
EOSINOPHIL NFR BLD AUTO: 1 %
ERYTHROCYTE [DISTWIDTH] IN BLOOD BY AUTOMATED COUNT: 15.9 % (ref 11–15)
EST. AVERAGE GLUCOSE BLD GHB EST-MCNC: 131 MG/DL (ref 68–126)
FASTING PATIENT LIPID ANSWER: YES
FASTING STATUS PATIENT QL REPORTED: YES
GLOBULIN PLAS-MCNC: 3.3 G/DL (ref 2–3.5)
GLUCOSE BLD-MCNC: 108 MG/DL (ref 70–99)
GLUCOSE UR-MCNC: NORMAL MG/DL
HBA1C MFR BLD: 6.2 % (ref ?–5.7)
HCT VFR BLD AUTO: 40.8 %
HDLC SERPL-MCNC: 62 MG/DL (ref 40–59)
HGB BLD-MCNC: 14 G/DL
HGB UR QL STRIP.AUTO: NEGATIVE
IMM GRANULOCYTES # BLD AUTO: 0.02 X10(3) UL (ref 0–1)
IMM GRANULOCYTES NFR BLD: 0.2 %
KETONES UR-MCNC: NEGATIVE MG/DL
LDLC SERPL CALC-MCNC: 75 MG/DL (ref ?–100)
LEUKOCYTE ESTERASE UR QL STRIP.AUTO: NEGATIVE
LYMPHOCYTES # BLD AUTO: 0.91 X10(3) UL (ref 1–4)
LYMPHOCYTES NFR BLD AUTO: 11.3 %
MCH RBC QN AUTO: 30.8 PG (ref 26–34)
MCHC RBC AUTO-ENTMCNC: 34.3 G/DL (ref 31–37)
MCV RBC AUTO: 89.9 FL
MONOCYTES # BLD AUTO: 0.71 X10(3) UL (ref 0.1–1)
MONOCYTES NFR BLD AUTO: 8.8 %
NEUTROPHILS # BLD AUTO: 6.32 X10 (3) UL (ref 1.5–7.7)
NEUTROPHILS # BLD AUTO: 6.32 X10(3) UL (ref 1.5–7.7)
NEUTROPHILS NFR BLD AUTO: 78.2 %
NITRITE UR QL STRIP.AUTO: NEGATIVE
NONHDLC SERPL-MCNC: 87 MG/DL (ref ?–130)
OSMOLALITY SERPL CALC.SUM OF ELEC: 293 MOSM/KG (ref 275–295)
PH UR: 7 [PH] (ref 5–8)
PLATELET # BLD AUTO: 307 10(3)UL (ref 150–450)
POTASSIUM SERPL-SCNC: 4.6 MMOL/L (ref 3.5–5.1)
PROT SERPL-MCNC: 7.4 G/DL (ref 5.7–8.2)
PROT UR-MCNC: NEGATIVE MG/DL
RBC # BLD AUTO: 4.54 X10(6)UL
SODIUM SERPL-SCNC: 138 MMOL/L (ref 136–145)
SP GR UR STRIP: 1.01 (ref 1–1.03)
T3FREE SERPL-MCNC: 2.43 PG/ML (ref 2.4–4.2)
T4 FREE SERPL-MCNC: 0.9 NG/DL (ref 0.8–1.7)
TRIGL SERPL-MCNC: 55 MG/DL (ref 30–149)
TSI SER-ACNC: 0.41 UIU/ML (ref 0.55–4.78)
UROBILINOGEN UR STRIP-ACNC: NORMAL
VIT D+METAB SERPL-MCNC: 30.3 NG/ML (ref 30–100)
VLDLC SERPL CALC-MCNC: 8 MG/DL (ref 0–30)
WBC # BLD AUTO: 8.1 X10(3) UL (ref 4–11)

## 2024-11-01 PROCEDURE — 82306 VITAMIN D 25 HYDROXY: CPT

## 2024-11-01 PROCEDURE — 84153 ASSAY OF PSA TOTAL: CPT

## 2024-11-01 PROCEDURE — 36415 COLL VENOUS BLD VENIPUNCTURE: CPT

## 2024-11-01 PROCEDURE — 84154 ASSAY OF PSA FREE: CPT

## 2024-11-01 PROCEDURE — 83036 HEMOGLOBIN GLYCOSYLATED A1C: CPT

## 2024-11-01 PROCEDURE — 81003 URINALYSIS AUTO W/O SCOPE: CPT

## 2024-11-01 PROCEDURE — 84439 ASSAY OF FREE THYROXINE: CPT

## 2024-11-01 PROCEDURE — 84481 FREE ASSAY (FT-3): CPT

## 2024-11-01 PROCEDURE — 85025 COMPLETE CBC W/AUTO DIFF WBC: CPT

## 2024-11-01 PROCEDURE — 80053 COMPREHEN METABOLIC PANEL: CPT

## 2024-11-01 PROCEDURE — 84443 ASSAY THYROID STIM HORMONE: CPT

## 2024-11-01 PROCEDURE — 80061 LIPID PANEL: CPT

## 2024-11-02 LAB
PROSTATE SPECIFIC AG: 0.3 NG/ML
PROSTATE SPECIFIC AG: 0.3 NG/ML

## 2024-11-04 RX ORDER — ROSUVASTATIN CALCIUM 20 MG/1
20 TABLET, COATED ORAL DAILY
Qty: 90 TABLET | Refills: 3 | Status: SHIPPED | OUTPATIENT
Start: 2024-11-04

## 2024-11-04 NOTE — TELEPHONE ENCOUNTER
Refill passed per [facility] protocol.  Requested Prescriptions   Pending Prescriptions Disp Refills    rosuvastatin 20 MG Oral Tab 90 tablet 3     Sig: Take 1 tablet (20 mg total) by mouth daily.       Cholesterol Medication Protocol Passed - 11/4/2024  4:11 PM        Passed - ALT < 80     Lab Results   Component Value Date    ALT 30 11/01/2024             Passed - ALT resulted within past year        Passed - Lipid panel within past 12 months     Lab Results   Component Value Date    CHOLEST 149 11/01/2024    TRIG 55 11/01/2024    HDL 62 (H) 11/01/2024    LDL 75 11/01/2024    VLDL 8 11/01/2024    NONHDLC 87 11/01/2024             Passed - In person appointment or virtual visit in the past 12 mos or appointment in next 3 mos     Recent Outpatient Visits              6 days ago Physical exam    [facility], Brandan Chow MD    Office Visit    11 months ago Primary hypertension    [facility], Brandan Chow MD    Office Visit    1 year ago Stage 3 chronic kidney disease, unspecified whether stage 3a or 3b CKD (HCC)    [facility] Mariano Sykes MD    Office Visit    1 year ago Pulmonary nodule    [facility] Ricardo White MD    Office Visit    1 year ago Primary hypertension    [facility], Brandan Chow MD    Office Visit          Future Appointments         Provider Department Appt Notes    In 4 months Mariano Sykes MD [facility] Stage 3 CKD Follow Up    In 4 months Ricardo White MD [facility] Pulmonary Nodule - speaks Telugu                       Future Appointments         Provider Department Appt Notes    In 4 months Mariano Sykes MD [facility]  [facility], [place] Stage 3 CKD Follow Up    In 4 months Ricardo White MD [facility], [facility], [place] Pulmonary Nodule - speaks Syriac          Recent Outpatient Visits              6 days ago Physical exam    [facility], Brandan Chow MD    Office Visit    11 months ago Primary hypertension    [facility], Brandan Chow MD    Office Visit    1 year ago Stage 3 chronic kidney disease, unspecified whether stage 3a or 3b CKD (HCC)    [facility], [facility], Mariano Chambers MD    Office Visit    1 year ago Pulmonary nodule    [facility]    Office Visit    1 year ago Primary hypertension    [facility], Brandan Chow MD    Office Visit

## 2024-11-05 ENCOUNTER — TELEPHONE (OUTPATIENT)
Dept: FAMILY MEDICINE CLINIC | Facility: CLINIC | Age: 84
End: 2024-11-05

## 2024-11-05 NOTE — TELEPHONE ENCOUNTER
Patient's daughter Sonja LARA would like referral for ophthalmology Dr.Raymond garza faxed to [phone]. Patient has appointment on 11/13/24. Please advise

## 2024-11-16 NOTE — TELEPHONE ENCOUNTER
Daughter in office visit with Mother today , requesting refills for medications for Father. Pended refills please advise if appropriate.

## 2024-12-18 ENCOUNTER — MED REC SCAN ONLY (OUTPATIENT)
Dept: FAMILY MEDICINE CLINIC | Facility: CLINIC | Age: 84
End: 2024-12-18

## 2024-12-30 ENCOUNTER — HOSPITAL ENCOUNTER (OUTPATIENT)
Dept: CT IMAGING | Age: 84
Discharge: HOME | End: 2024-12-30
Attending: INTERNAL MEDICINE
Payer: MEDICAID

## 2024-12-30 DIAGNOSIS — R91.1 PULMONARY NODULE: ICD-10-CM

## 2024-12-30 PROCEDURE — 71250 CT THORAX DX C-: CPT | Performed by: INTERNAL MEDICINE

## 2025-01-02 ENCOUNTER — TELEPHONE (OUTPATIENT)
Dept: PULMONOLOGY | Facility: CLINIC | Age: 85
End: 2025-01-02

## 2025-01-02 NOTE — TELEPHONE ENCOUNTER
Per [employer] return receipt, certified letter regarding overdue CT chest was delivered on 12/18/24.

## 2025-01-06 DIAGNOSIS — R91.1 PULMONARY NODULE: Primary | ICD-10-CM

## 2025-01-06 NOTE — TELEPHONE ENCOUNTER
Pharmacy requesting refill     aspirin (ASPIRIN LOW DOSE) 81 MG Oral Tab EC Take 1 tablet (81 mg total) by mouth daily. 90 tablet 3      finasteride 5 MG Oral Tab Take 1 tablet (5 mg total) by mouth daily. 90 tablet 3

## 2025-01-10 RX ORDER — FINASTERIDE 5 MG/1
5 TABLET, FILM COATED ORAL DAILY
Qty: 90 TABLET | Refills: 3 | Status: SHIPPED | OUTPATIENT
Start: 2025-01-10

## 2025-01-10 RX ORDER — ASPIRIN 81 MG/1
81 TABLET ORAL DAILY
Qty: 90 TABLET | Refills: 3 | Status: SHIPPED | OUTPATIENT
Start: 2025-01-10

## 2025-01-10 NOTE — TELEPHONE ENCOUNTER
Refill passed per [facility] protocol.    Requested Prescriptions   Pending Prescriptions Disp Refills    aspirin (ASPIRIN LOW DOSE) 81 MG Oral Tab EC 90 tablet 3     Sig: Take 1 tablet (81 mg total) by mouth daily.       Aspirin Protocol Passed - 1/10/2025  7:50 AM        Passed - In person appointment or virtual visit in the past 6 mos or appointment in next 3 mos     Recent Outpatient Visits              2 months ago Physical exam    [facility], Brandan Chow MD    Office Visit    1 year ago [facility]    Office Visit    1 year ago Stage 3 chronic kidney disease, unspecified whether stage 3a or 3b CKD (HCC)    [facility] Mariano Sykes MD    Office Visit    1 year ago Pulmonary nodule    [facility] Ricardo White MD    Office Visit    1 year ago Primary hypertension    [facility], Brandan Chow MD    Office Visit          Future Appointments         Provider Department Appt Notes    In 2 months [facility]    In 2 months Ricardo White MD [facility] Pulmonary Nodule - speaks Slovak                    Passed - Medication is active on med list          finasteride 5 MG Oral Tab 90 tablet 3     Sig: Take 1 tablet (5 mg total) by mouth daily.       Genitourinary Medications Passed - 1/10/2025  7:50 AM        Passed - Patient does not have pulmonary hypertension on problem list        Passed - In person appointment or virtual visit in the past 12 mos or appointment in next 3 mos     Recent Outpatient Visits              2 months ago Physical exam    [facility],  Brandan Chow MD    Office Visit    1 year ago Primary hypertension    [facility], Brandan Chow MD    Office Visit    1 year ago Stage 3 chronic kidney disease, unspecified whether stage 3a or 3b CKD (HCC)    [facility] [place] Mariano Sykes MD    Office Visit    1 year ago Pulmonary nodule    [facility] Ricardo White MD    Office Visit    1 year ago Primary hypertension    [facility], Brandan Chow MD    Office Visit          Future Appointments         Provider Department Appt Notes    In 2 months [facility]    In 2 months Ricardo White MD [facility] Pulmonary Nodule - speaks Mont[place]erratian                    Passed - Medication is active on med list           Future Appointments         Provider Department Appt Notes    In 2 months [facility] Stage [facility] Follow Up    In 2 months Ricardo White MD [facility] Pulmonary Nodule - speaks [place]erratian          Recent Outpatient Visits              2 months ago Physical exam    [facility], Brandan Chow MD    Office Visit    1 year ago Primary hypertension    [facility], Brandan Chow MD    Office Visit    1 year ago Stage 3 chronic kidney disease, unspecified whether stage 3a or 3b CKD (HCC)    [facility] [place] Mariano Sykes MD    Office Visit    1 year ago Pulmonary nodule    [facility], [place] Ricardo White MD    Office Visit    1 year ago Primary hypertension     [facility] [address], Brandan Chow MD    Office Visit

## 2025-08-18 ENCOUNTER — HOSPITAL ENCOUNTER (OUTPATIENT)
Age: 85
Discharge: HOME | End: 2025-08-18

## 2025-08-18 VITALS
RESPIRATION RATE: 16 BRPM | SYSTOLIC BLOOD PRESSURE: 108 MMHG | OXYGEN SATURATION: 97 % | DIASTOLIC BLOOD PRESSURE: 60 MMHG | HEART RATE: 107 BPM | TEMPERATURE: 98 F

## 2025-08-18 DIAGNOSIS — H92.01 EARACHE ON RIGHT: Primary | ICD-10-CM

## 2025-08-18 PROCEDURE — 69209 REMOVE IMPACTED EAR WAX UNI: CPT

## 2025-08-25 ENCOUNTER — HOSPITAL ENCOUNTER (OUTPATIENT)
Dept: GENERAL RADIOLOGY | Age: 85
Discharge: HOME | End: 2025-08-25
Attending: FAMILY MEDICINE

## 2025-08-25 ENCOUNTER — OFFICE VISIT (OUTPATIENT)
Dept: FAMILY MEDICINE CLINIC | Facility: CLINIC | Age: 85
End: 2025-08-25

## 2025-08-25 VITALS
WEIGHT: 157 LBS | HEIGHT: 66 IN | DIASTOLIC BLOOD PRESSURE: 74 MMHG | SYSTOLIC BLOOD PRESSURE: 120 MMHG | HEART RATE: 90 BPM | BODY MASS INDEX: 25.23 KG/M2

## 2025-08-25 DIAGNOSIS — S43.005A DISLOCATED SHOULDER, LEFT, INITIAL ENCOUNTER: ICD-10-CM

## 2025-08-25 DIAGNOSIS — R73.03 PREDIABETES: Primary | ICD-10-CM

## 2025-08-25 LAB — HEMOGLOBIN A1C: 5.6 % (ref 4.3–5.6)

## 2025-08-25 PROCEDURE — 73030 X-RAY EXAM OF SHOULDER: CPT | Performed by: FAMILY MEDICINE

## 2025-08-28 ENCOUNTER — HOSPITAL ENCOUNTER (OUTPATIENT)
Dept: GENERAL RADIOLOGY | Facility: HOSPITAL | Age: 85
Discharge: HOME | End: 2025-08-28
Attending: STUDENT IN AN ORGANIZED HEALTH CARE EDUCATION/TRAINING PROGRAM

## 2025-08-28 ENCOUNTER — OFFICE VISIT (OUTPATIENT)
Facility: CLINIC | Age: 85
End: 2025-08-28

## 2025-08-28 VITALS — SYSTOLIC BLOOD PRESSURE: 118 MMHG | HEART RATE: 85 BPM | DIASTOLIC BLOOD PRESSURE: 74 MMHG

## 2025-08-28 DIAGNOSIS — M12.812 ROTATOR CUFF ARTHROPATHY OF LEFT SHOULDER: ICD-10-CM

## 2025-08-28 DIAGNOSIS — R52 PAIN: Primary | ICD-10-CM

## 2025-08-28 DIAGNOSIS — R52 PAIN: ICD-10-CM

## 2025-08-28 PROCEDURE — 73030 X-RAY EXAM OF SHOULDER: CPT | Performed by: STUDENT IN AN ORGANIZED HEALTH CARE EDUCATION/TRAINING PROGRAM

## 2025-08-28 RX ORDER — METHYLPREDNISOLONE ACETATE 40 MG/ML
40 INJECTION, SUSPENSION INTRA-ARTICULAR; INTRALESIONAL; INTRAMUSCULAR; SOFT TISSUE ONCE
Status: COMPLETED | OUTPATIENT
Start: 2025-08-28 | End: 2025-08-28

## 2025-08-28 RX ORDER — KETOROLAC TROMETHAMINE 30 MG/ML
30 INJECTION, SOLUTION INTRAMUSCULAR; INTRAVENOUS ONCE
Status: COMPLETED | OUTPATIENT
Start: 2025-08-28 | End: 2025-08-28

## 2025-08-28 RX ADMIN — KETOROLAC TROMETHAMINE 30 MG: 30 INJECTION, SOLUTION INTRAMUSCULAR; INTRAVENOUS at 16:31:00

## 2025-08-28 RX ADMIN — METHYLPREDNISOLONE ACETATE 40 MG: 40 INJECTION, SUSPENSION INTRA-ARTICULAR; INTRALESIONAL; INTRAMUSCULAR; SOFT TISSUE at 16:31:00

## 2025-09-01 ENCOUNTER — PATIENT OUTREACH (OUTPATIENT)
Dept: CASE MANAGEMENT | Age: 85
End: 2025-09-01

## (undated) NOTE — LETTER
[place] [id number]      2/3/2023    Dear Shital Hui,    It has come to our attention that a required CT CHEST test is due in March. This test was ordered by Dr. Noris Richardson. This test requires a prior authorization. The [facility] Department at [phone] will obtain the prior authorization and contact you when it has been approved. You can schedule the test once you receive approval notification. If you have any questions please contact our office at [phone].        Thank you,    The [facility]monary Clinical Staff

## (undated) NOTE — LETTER
Doyle Denton MD       06/30/17        Patient: Elizabeth Galan   YOB: 1940   Date of Visit: 6/30/2017       Dear  Dr. Rasheed Palacios      Thank you for referring Elizabeth Galan to my practice. Please find my assessment and plan below.